# Patient Record
Sex: FEMALE | Race: WHITE | Employment: OTHER | URBAN - METROPOLITAN AREA
[De-identification: names, ages, dates, MRNs, and addresses within clinical notes are randomized per-mention and may not be internally consistent; named-entity substitution may affect disease eponyms.]

---

## 2017-05-26 ENCOUNTER — HOSPITAL ENCOUNTER (OUTPATIENT)
Dept: WOMENS IMAGING | Age: 67
Discharge: HOME | End: 2017-05-26
Payer: MEDICARE

## 2017-05-26 DIAGNOSIS — Z13.9 SCREENING: ICD-10-CM

## 2017-05-26 PROCEDURE — 77063 BREAST TOMOSYNTHESIS BI: CPT

## 2018-06-08 ENCOUNTER — HOSPITAL ENCOUNTER (OUTPATIENT)
Dept: WOMENS IMAGING | Age: 68
Discharge: HOME | End: 2018-06-10
Payer: MEDICARE

## 2018-06-08 DIAGNOSIS — Z12.31 ENCOUNTER FOR SCREENING MAMMOGRAM FOR BREAST CANCER: ICD-10-CM

## 2018-06-08 PROCEDURE — 77067 SCR MAMMO BI INCL CAD: CPT

## 2019-07-11 ENCOUNTER — HOSPITAL ENCOUNTER (OUTPATIENT)
Dept: WOMENS IMAGING | Age: 69
Discharge: HOME | End: 2019-07-13
Payer: MEDICARE

## 2019-07-11 DIAGNOSIS — Z12.31 ENCOUNTER FOR SCREENING MAMMOGRAM FOR BREAST CANCER: ICD-10-CM

## 2019-07-11 PROCEDURE — 77063 BREAST TOMOSYNTHESIS BI: CPT

## 2020-09-16 ENCOUNTER — HOSPITAL ENCOUNTER (OUTPATIENT)
Dept: WOMENS IMAGING | Age: 70
Discharge: HOME | End: 2020-09-18
Payer: MEDICARE

## 2020-09-16 PROCEDURE — 77063 BREAST TOMOSYNTHESIS BI: CPT

## 2021-12-09 ENCOUNTER — HOSPITAL ENCOUNTER (OUTPATIENT)
Dept: WOMENS IMAGING | Age: 71
Discharge: HOME | End: 2021-12-11
Payer: MEDICARE

## 2021-12-09 DIAGNOSIS — Z12.31 ENCOUNTER FOR SCREENING MAMMOGRAM FOR BREAST CANCER: ICD-10-CM

## 2021-12-09 PROCEDURE — 77063 BREAST TOMOSYNTHESIS BI: CPT

## 2023-04-18 ENCOUNTER — HOSPITAL ENCOUNTER (OUTPATIENT)
Dept: WOMENS IMAGING | Age: 73
Discharge: HOME | End: 2023-04-20
Payer: MEDICARE

## 2023-04-18 DIAGNOSIS — Z12.31 ENCOUNTER FOR SCREENING MAMMOGRAM FOR BREAST CANCER: ICD-10-CM

## 2023-04-18 PROCEDURE — 77063 BREAST TOMOSYNTHESIS BI: CPT

## 2024-07-02 ENCOUNTER — HOSPITAL ENCOUNTER (OUTPATIENT)
Dept: WOMENS IMAGING | Age: 74
Discharge: HOME | End: 2024-07-04
Payer: MEDICARE

## 2024-07-02 ENCOUNTER — TRANSCRIBE ORDERS (OUTPATIENT)
Dept: WOMENS IMAGING | Age: 74
End: 2024-07-02

## 2024-07-02 VITALS — HEIGHT: 64 IN | WEIGHT: 143 LBS | BODY MASS INDEX: 24.41 KG/M2

## 2024-07-02 DIAGNOSIS — Z12.31 SCREENING MAMMOGRAM FOR BREAST CANCER: Primary | ICD-10-CM

## 2024-07-02 DIAGNOSIS — Z12.31 SCREENING MAMMOGRAM FOR BREAST CANCER: ICD-10-CM

## 2024-07-02 PROCEDURE — 77063 BREAST TOMOSYNTHESIS BI: CPT

## 2025-02-03 ENCOUNTER — APPOINTMENT (OUTPATIENT)
Dept: RADIOLOGY | Facility: HOSPITAL | Age: 75
End: 2025-02-03
Payer: MEDICARE

## 2025-02-03 ENCOUNTER — APPOINTMENT (OUTPATIENT)
Dept: CARDIOLOGY | Facility: HOSPITAL | Age: 75
End: 2025-02-03
Payer: MEDICARE

## 2025-02-03 ENCOUNTER — HOSPITAL ENCOUNTER (INPATIENT)
Facility: HOSPITAL | Age: 75
LOS: 3 days | Discharge: NURSING, REHAB OR LONG-TERM CARE | End: 2025-02-07
Attending: EMERGENCY MEDICINE | Admitting: STUDENT IN AN ORGANIZED HEALTH CARE EDUCATION/TRAINING PROGRAM
Payer: MEDICARE

## 2025-02-03 DIAGNOSIS — S42.411A CLOSED SUPRACONDYLAR FRACTURE OF RIGHT HUMERUS, INITIAL ENCOUNTER: ICD-10-CM

## 2025-02-03 DIAGNOSIS — S32.401A CLOSED DISPLACED FRACTURE OF RIGHT ACETABULUM, UNSPECIFIED PORTION OF ACETABULUM, INITIAL ENCOUNTER (MULTI): ICD-10-CM
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
## 2025-02-03 LAB
ALBUMIN SERPL BCP-MCNC: 3.7 G/DL (ref 3.4–5)
ALP SERPL-CCNC: 74 U/L (ref 33–136)
ALT SERPL W P-5'-P-CCNC: 12 U/L (ref 7–45)
ANION GAP SERPL CALC-SCNC: 14 MMOL/L (ref 10–20)
AST SERPL W P-5'-P-CCNC: 19 U/L (ref 9–39)
ATRIAL RATE: 67 BPM
BASOPHILS # BLD AUTO: 0.04 X10*3/UL (ref 0–0.1)
BASOPHILS NFR BLD AUTO: 0.5 %
BILIRUB SERPL-MCNC: 0.3 MG/DL (ref 0–1.2)
BUN SERPL-MCNC: 16 MG/DL (ref 6–23)
CALCIUM SERPL-MCNC: 8.8 MG/DL (ref 8.6–10.3)
CARDIAC TROPONIN I PNL SERPL HS: 4 NG/L (ref 0–13)
CARDIAC TROPONIN I PNL SERPL HS: 5 NG/L (ref 0–13)
CHLORIDE SERPL-SCNC: 108 MMOL/L (ref 98–107)
CO2 SERPL-SCNC: 22 MMOL/L (ref 21–32)
CREAT SERPL-MCNC: 0.98 MG/DL (ref 0.5–1.05)
EGFRCR SERPLBLD CKD-EPI 2021: 61 ML/MIN/1.73M*2
EOSINOPHIL # BLD AUTO: 0.13 X10*3/UL (ref 0–0.4)
EOSINOPHIL NFR BLD AUTO: 1.6 %
ERYTHROCYTE [DISTWIDTH] IN BLOOD BY AUTOMATED COUNT: 12.5 % (ref 11.5–14.5)
GLUCOSE SERPL-MCNC: 129 MG/DL (ref 74–99)
HCT VFR BLD AUTO: 37.8 % (ref 36–46)
HGB BLD-MCNC: 12.8 G/DL (ref 12–16)
IMM GRANULOCYTES # BLD AUTO: 0.04 X10*3/UL (ref 0–0.5)
IMM GRANULOCYTES NFR BLD AUTO: 0.5 % (ref 0–0.9)
LYMPHOCYTES # BLD AUTO: 1.09 X10*3/UL (ref 0.8–3)
LYMPHOCYTES NFR BLD AUTO: 13.5 %
MAGNESIUM SERPL-MCNC: 2.01 MG/DL (ref 1.6–2.4)
MCH RBC QN AUTO: 29 PG (ref 26–34)
MCHC RBC AUTO-ENTMCNC: 33.9 G/DL (ref 32–36)
MCV RBC AUTO: 86 FL (ref 80–100)
MONOCYTES # BLD AUTO: 0.59 X10*3/UL (ref 0.05–0.8)
MONOCYTES NFR BLD AUTO: 7.3 %
NEUTROPHILS # BLD AUTO: 6.17 X10*3/UL (ref 1.6–5.5)
NEUTROPHILS NFR BLD AUTO: 76.6 %
NRBC BLD-RTO: 0 /100 WBCS (ref 0–0)
P AXIS: 72 DEGREES
P OFFSET: 185 MS
P ONSET: 134 MS
PLATELET # BLD AUTO: 174 X10*3/UL (ref 150–450)
POTASSIUM SERPL-SCNC: 3.4 MMOL/L (ref 3.5–5.3)
PR INTERVAL: 178 MS
PROT SERPL-MCNC: 6.6 G/DL (ref 6.4–8.2)
Q ONSET: 223 MS
QRS COUNT: 11 BEATS
QRS DURATION: 74 MS
QT INTERVAL: 450 MS
QTC CALCULATION(BAZETT): 475 MS
QTC FREDERICIA: 467 MS
R AXIS: 68 DEGREES
RBC # BLD AUTO: 4.41 X10*6/UL (ref 4–5.2)
SODIUM SERPL-SCNC: 141 MMOL/L (ref 136–145)
T AXIS: 72 DEGREES
T OFFSET: 448 MS
VENTRICULAR RATE: 67 BPM
WBC # BLD AUTO: 8.1 X10*3/UL (ref 4.4–11.3)

## 2025-02-03 PROCEDURE — 84075 ASSAY ALKALINE PHOSPHATASE: CPT | Performed by: EMERGENCY MEDICINE

## 2025-02-03 PROCEDURE — 72125 CT NECK SPINE W/O DYE: CPT | Performed by: RADIOLOGY

## 2025-02-03 PROCEDURE — 93005 ELECTROCARDIOGRAM TRACING: CPT

## 2025-02-03 PROCEDURE — 23600 CLTX PROX HUMRL FX W/O MNPJ: CPT | Performed by: ORTHOPAEDIC SURGERY

## 2025-02-03 PROCEDURE — 99222 1ST HOSP IP/OBS MODERATE 55: CPT | Performed by: ORTHOPAEDIC SURGERY

## 2025-02-03 PROCEDURE — 2500000004 HC RX 250 GENERAL PHARMACY W/ HCPCS (ALT 636 FOR OP/ED): Performed by: EMERGENCY MEDICINE

## 2025-02-03 PROCEDURE — 73502 X-RAY EXAM HIP UNI 2-3 VIEWS: CPT | Mod: RT

## 2025-02-03 PROCEDURE — 83735 ASSAY OF MAGNESIUM: CPT | Performed by: EMERGENCY MEDICINE

## 2025-02-03 PROCEDURE — 36415 COLL VENOUS BLD VENIPUNCTURE: CPT | Performed by: EMERGENCY MEDICINE

## 2025-02-03 PROCEDURE — 70450 CT HEAD/BRAIN W/O DYE: CPT

## 2025-02-03 PROCEDURE — 71045 X-RAY EXAM CHEST 1 VIEW: CPT | Performed by: RADIOLOGY

## 2025-02-03 PROCEDURE — 73000 X-RAY EXAM OF COLLAR BONE: CPT | Mod: RIGHT SIDE | Performed by: RADIOLOGY

## 2025-02-03 PROCEDURE — 99285 EMERGENCY DEPT VISIT HI MDM: CPT | Mod: 25 | Performed by: EMERGENCY MEDICINE

## 2025-02-03 PROCEDURE — 2500000001 HC RX 250 WO HCPCS SELF ADMINISTERED DRUGS (ALT 637 FOR MEDICARE OP): Performed by: EMERGENCY MEDICINE

## 2025-02-03 PROCEDURE — 73700 CT LOWER EXTREMITY W/O DYE: CPT | Mod: RT

## 2025-02-03 PROCEDURE — 71045 X-RAY EXAM CHEST 1 VIEW: CPT

## 2025-02-03 PROCEDURE — 73030 X-RAY EXAM OF SHOULDER: CPT | Mod: RIGHT SIDE | Performed by: RADIOLOGY

## 2025-02-03 PROCEDURE — 85025 COMPLETE CBC W/AUTO DIFF WBC: CPT | Performed by: EMERGENCY MEDICINE

## 2025-02-03 PROCEDURE — 99222 1ST HOSP IP/OBS MODERATE 55: CPT

## 2025-02-03 PROCEDURE — 73000 X-RAY EXAM OF COLLAR BONE: CPT | Mod: RT

## 2025-02-03 PROCEDURE — 99223 1ST HOSP IP/OBS HIGH 75: CPT | Performed by: STUDENT IN AN ORGANIZED HEALTH CARE EDUCATION/TRAINING PROGRAM

## 2025-02-03 PROCEDURE — 72125 CT NECK SPINE W/O DYE: CPT

## 2025-02-03 PROCEDURE — 73060 X-RAY EXAM OF HUMERUS: CPT | Mod: RIGHT SIDE | Performed by: RADIOLOGY

## 2025-02-03 PROCEDURE — 84484 ASSAY OF TROPONIN QUANT: CPT | Performed by: EMERGENCY MEDICINE

## 2025-02-03 PROCEDURE — G0378 HOSPITAL OBSERVATION PER HR: HCPCS

## 2025-02-03 PROCEDURE — 73502 X-RAY EXAM HIP UNI 2-3 VIEWS: CPT | Mod: RIGHT SIDE | Performed by: RADIOLOGY

## 2025-02-03 PROCEDURE — 2500000002 HC RX 250 W HCPCS SELF ADMINISTERED DRUGS (ALT 637 FOR MEDICARE OP, ALT 636 FOR OP/ED): Performed by: EMERGENCY MEDICINE

## 2025-02-03 PROCEDURE — 2500000002 HC RX 250 W HCPCS SELF ADMINISTERED DRUGS (ALT 637 FOR MEDICARE OP, ALT 636 FOR OP/ED): Performed by: STUDENT IN AN ORGANIZED HEALTH CARE EDUCATION/TRAINING PROGRAM

## 2025-02-03 PROCEDURE — 96376 TX/PRO/DX INJ SAME DRUG ADON: CPT

## 2025-02-03 PROCEDURE — 96375 TX/PRO/DX INJ NEW DRUG ADDON: CPT

## 2025-02-03 PROCEDURE — 73700 CT LOWER EXTREMITY W/O DYE: CPT | Mod: RIGHT SIDE | Performed by: STUDENT IN AN ORGANIZED HEALTH CARE EDUCATION/TRAINING PROGRAM

## 2025-02-03 PROCEDURE — 2500000005 HC RX 250 GENERAL PHARMACY W/O HCPCS: Performed by: STUDENT IN AN ORGANIZED HEALTH CARE EDUCATION/TRAINING PROGRAM

## 2025-02-03 PROCEDURE — 2500000001 HC RX 250 WO HCPCS SELF ADMINISTERED DRUGS (ALT 637 FOR MEDICARE OP): Performed by: STUDENT IN AN ORGANIZED HEALTH CARE EDUCATION/TRAINING PROGRAM

## 2025-02-03 PROCEDURE — 2500000004 HC RX 250 GENERAL PHARMACY W/ HCPCS (ALT 636 FOR OP/ED)

## 2025-02-03 PROCEDURE — 2500000004 HC RX 250 GENERAL PHARMACY W/ HCPCS (ALT 636 FOR OP/ED): Performed by: STUDENT IN AN ORGANIZED HEALTH CARE EDUCATION/TRAINING PROGRAM

## 2025-02-03 PROCEDURE — 73030 X-RAY EXAM OF SHOULDER: CPT | Mod: RT

## 2025-02-03 PROCEDURE — 27220 TREAT HIP SOCKET FRACTURE: CPT | Performed by: ORTHOPAEDIC SURGERY

## 2025-02-03 PROCEDURE — 73060 X-RAY EXAM OF HUMERUS: CPT | Mod: RT

## 2025-02-03 PROCEDURE — 70450 CT HEAD/BRAIN W/O DYE: CPT | Performed by: RADIOLOGY

## 2025-02-03 PROCEDURE — 96374 THER/PROPH/DIAG INJ IV PUSH: CPT | Mod: 59

## 2025-02-03 PROCEDURE — 96372 THER/PROPH/DIAG INJ SC/IM: CPT | Performed by: STUDENT IN AN ORGANIZED HEALTH CARE EDUCATION/TRAINING PROGRAM

## 2025-02-03 RX ORDER — ACETAMINOPHEN 650 MG/1
650 SUPPOSITORY RECTAL EVERY 4 HOURS PRN
Status: DISCONTINUED | OUTPATIENT
Start: 2025-02-03 | End: 2025-02-07

## 2025-02-03 RX ORDER — FENTANYL CITRATE 50 UG/ML
50 INJECTION, SOLUTION INTRAMUSCULAR; INTRAVENOUS ONCE
Status: COMPLETED | OUTPATIENT
Start: 2025-02-03 | End: 2025-02-03

## 2025-02-03 RX ORDER — MORPHINE SULFATE 2 MG/ML
2 INJECTION, SOLUTION INTRAMUSCULAR; INTRAVENOUS EVERY 4 HOURS PRN
Status: DISCONTINUED | OUTPATIENT
Start: 2025-02-03 | End: 2025-02-05

## 2025-02-03 RX ORDER — LOSARTAN POTASSIUM 50 MG/1
50 TABLET ORAL
COMMUNITY
Start: 2024-12-16

## 2025-02-03 RX ORDER — HYDROXYZINE HYDROCHLORIDE 25 MG/1
50 TABLET, FILM COATED ORAL EVERY 4 HOURS PRN
Status: DISCONTINUED | OUTPATIENT
Start: 2025-02-03 | End: 2025-02-07 | Stop reason: HOSPADM

## 2025-02-03 RX ORDER — ACETAMINOPHEN 160 MG/5ML
650 SOLUTION ORAL EVERY 4 HOURS PRN
Status: DISCONTINUED | OUTPATIENT
Start: 2025-02-03 | End: 2025-02-07

## 2025-02-03 RX ORDER — ENOXAPARIN SODIUM 100 MG/ML
40 INJECTION SUBCUTANEOUS EVERY 24 HOURS
Status: DISCONTINUED | OUTPATIENT
Start: 2025-02-03 | End: 2025-02-07 | Stop reason: HOSPADM

## 2025-02-03 RX ORDER — PANTOPRAZOLE SODIUM 40 MG/1
40 TABLET, DELAYED RELEASE ORAL 2 TIMES DAILY
COMMUNITY
Start: 2024-09-27

## 2025-02-03 RX ORDER — POLYETHYLENE GLYCOL 3350 17 G/17G
17 POWDER, FOR SOLUTION ORAL DAILY PRN
Status: DISCONTINUED | OUTPATIENT
Start: 2025-02-03 | End: 2025-02-07 | Stop reason: HOSPADM

## 2025-02-03 RX ORDER — ESCITALOPRAM OXALATE 10 MG/1
30 TABLET ORAL
COMMUNITY
Start: 2024-06-11 | End: 2025-06-11

## 2025-02-03 RX ORDER — NIFEDIPINE 30 MG/1
30 TABLET, FILM COATED, EXTENDED RELEASE ORAL
Status: DISCONTINUED | OUTPATIENT
Start: 2025-02-03 | End: 2025-02-07 | Stop reason: HOSPADM

## 2025-02-03 RX ORDER — MORPHINE SULFATE 4 MG/ML
4 INJECTION, SOLUTION INTRAMUSCULAR; INTRAVENOUS ONCE
Status: COMPLETED | OUTPATIENT
Start: 2025-02-03 | End: 2025-02-03

## 2025-02-03 RX ORDER — OXYCODONE HYDROCHLORIDE 5 MG/1
5 TABLET ORAL EVERY 6 HOURS PRN
Status: DISCONTINUED | OUTPATIENT
Start: 2025-02-03 | End: 2025-02-05

## 2025-02-03 RX ORDER — ACETAMINOPHEN 325 MG/1
650 TABLET ORAL EVERY 4 HOURS PRN
Status: DISCONTINUED | OUTPATIENT
Start: 2025-02-03 | End: 2025-02-07

## 2025-02-03 RX ORDER — EZETIMIBE 10 MG/1
10 TABLET ORAL
COMMUNITY
Start: 2024-09-20 | End: 2025-09-20

## 2025-02-03 RX ORDER — ACETAMINOPHEN 325 MG/1
1000 TABLET ORAL ONCE
Status: COMPLETED | OUTPATIENT
Start: 2025-02-03 | End: 2025-02-03

## 2025-02-03 RX ORDER — ONDANSETRON HYDROCHLORIDE 2 MG/ML
INJECTION, SOLUTION INTRAVENOUS
Status: COMPLETED
Start: 2025-02-03 | End: 2025-02-03

## 2025-02-03 RX ORDER — LOSARTAN POTASSIUM 50 MG/1
50 TABLET ORAL
Status: DISCONTINUED | OUTPATIENT
Start: 2025-02-03 | End: 2025-02-07 | Stop reason: HOSPADM

## 2025-02-03 RX ORDER — FLUTICASONE PROPIONATE 50 MCG
1 SPRAY, SUSPENSION (ML) NASAL DAILY PRN
COMMUNITY
Start: 2024-09-19

## 2025-02-03 RX ORDER — ONDANSETRON HYDROCHLORIDE 2 MG/ML
4 INJECTION, SOLUTION INTRAVENOUS EVERY 6 HOURS PRN
Status: DISCONTINUED | OUTPATIENT
Start: 2025-02-03 | End: 2025-02-07 | Stop reason: HOSPADM

## 2025-02-03 RX ORDER — POTASSIUM CHLORIDE 20 MEQ/1
20 TABLET, EXTENDED RELEASE ORAL ONCE
Status: COMPLETED | OUTPATIENT
Start: 2025-02-03 | End: 2025-02-03

## 2025-02-03 RX ORDER — FAMOTIDINE 20 MG/1
40 TABLET, FILM COATED ORAL NIGHTLY
Status: DISCONTINUED | OUTPATIENT
Start: 2025-02-03 | End: 2025-02-07 | Stop reason: HOSPADM

## 2025-02-03 RX ORDER — ONDANSETRON HYDROCHLORIDE 2 MG/ML
4 INJECTION, SOLUTION INTRAVENOUS ONCE
Status: COMPLETED | OUTPATIENT
Start: 2025-02-03 | End: 2025-02-03

## 2025-02-03 RX ORDER — FAMOTIDINE 40 MG/1
1 TABLET, FILM COATED ORAL NIGHTLY
COMMUNITY
Start: 2025-01-09 | End: 2026-01-09

## 2025-02-03 RX ORDER — EZETIMIBE 10 MG/1
10 TABLET ORAL
Status: DISCONTINUED | OUTPATIENT
Start: 2025-02-03 | End: 2025-02-07 | Stop reason: HOSPADM

## 2025-02-03 RX ORDER — ESCITALOPRAM OXALATE 10 MG/1
30 TABLET ORAL
Status: DISCONTINUED | OUTPATIENT
Start: 2025-02-03 | End: 2025-02-07 | Stop reason: HOSPADM

## 2025-02-03 RX ORDER — NIFEDIPINE 30 MG/1
30 TABLET, EXTENDED RELEASE ORAL
COMMUNITY
Start: 2024-04-23

## 2025-02-03 RX ORDER — LIDOCAINE 560 MG/1
1 PATCH PERCUTANEOUS; TOPICAL; TRANSDERMAL DAILY
Status: DISCONTINUED | OUTPATIENT
Start: 2025-02-03 | End: 2025-02-07 | Stop reason: HOSPADM

## 2025-02-03 RX ADMIN — ONDANSETRON 4 MG: 2 INJECTION INTRAMUSCULAR; INTRAVENOUS at 09:11

## 2025-02-03 RX ADMIN — ONDANSETRON 4 MG: 2 INJECTION INTRAMUSCULAR; INTRAVENOUS at 14:31

## 2025-02-03 RX ADMIN — MORPHINE SULFATE 2 MG: 2 INJECTION, SOLUTION INTRAMUSCULAR; INTRAVENOUS at 14:11

## 2025-02-03 RX ADMIN — ENOXAPARIN SODIUM 40 MG: 40 INJECTION SUBCUTANEOUS at 15:02

## 2025-02-03 RX ADMIN — FAMOTIDINE 40 MG: 20 TABLET, FILM COATED ORAL at 16:05

## 2025-02-03 RX ADMIN — NIFEDIPINE 30 MG: 30 TABLET, FILM COATED, EXTENDED RELEASE ORAL at 14:59

## 2025-02-03 RX ADMIN — POTASSIUM CHLORIDE 20 MEQ: 1500 TABLET, EXTENDED RELEASE ORAL at 11:48

## 2025-02-03 RX ADMIN — ONDANSETRON HYDROCHLORIDE 4 MG: 2 INJECTION, SOLUTION INTRAVENOUS at 10:10

## 2025-02-03 RX ADMIN — HYDROMORPHONE HYDROCHLORIDE 0.5 MG: 1 INJECTION, SOLUTION INTRAMUSCULAR; INTRAVENOUS; SUBCUTANEOUS at 11:21

## 2025-02-03 RX ADMIN — LIDOCAINE 4% 1 PATCH: 40 PATCH TOPICAL at 15:01

## 2025-02-03 RX ADMIN — OXYCODONE HYDROCHLORIDE 5 MG: 5 TABLET ORAL at 23:12

## 2025-02-03 RX ADMIN — ACETAMINOPHEN 975 MG: 325 TABLET ORAL at 11:48

## 2025-02-03 RX ADMIN — HYDROXYZINE HYDROCHLORIDE 50 MG: 25 TABLET ORAL at 16:12

## 2025-02-03 RX ADMIN — ESCITALOPRAM OXALATE 30 MG: 10 TABLET, FILM COATED ORAL at 14:59

## 2025-02-03 RX ADMIN — ONDANSETRON 4 MG: 2 INJECTION INTRAMUSCULAR; INTRAVENOUS at 10:10

## 2025-02-03 RX ADMIN — LOSARTAN POTASSIUM 50 MG: 50 TABLET, FILM COATED ORAL at 14:59

## 2025-02-03 RX ADMIN — EZETIMIBE 10 MG: 10 TABLET ORAL at 14:59

## 2025-02-03 RX ADMIN — MORPHINE SULFATE 4 MG: 4 INJECTION, SOLUTION INTRAMUSCULAR; INTRAVENOUS at 10:07

## 2025-02-03 RX ADMIN — FENTANYL CITRATE 50 MCG: 50 INJECTION INTRAMUSCULAR; INTRAVENOUS at 09:11

## 2025-02-03 SDOH — SOCIAL STABILITY: SOCIAL INSECURITY: WERE YOU ABLE TO COMPLETE ALL THE BEHAVIORAL HEALTH SCREENINGS?: YES

## 2025-02-03 SDOH — SOCIAL STABILITY: SOCIAL INSECURITY
WITHIN THE LAST YEAR, HAVE YOU BEEN RAPED OR FORCED TO HAVE ANY KIND OF SEXUAL ACTIVITY BY YOUR PARTNER OR EX-PARTNER?: NO

## 2025-02-03 SDOH — HEALTH STABILITY: MENTAL HEALTH: HOW MANY DRINKS CONTAINING ALCOHOL DO YOU HAVE ON A TYPICAL DAY WHEN YOU ARE DRINKING?: PATIENT DOES NOT DRINK

## 2025-02-03 SDOH — SOCIAL STABILITY: SOCIAL INSECURITY: WITHIN THE LAST YEAR, HAVE YOU BEEN HUMILIATED OR EMOTIONALLY ABUSED IN OTHER WAYS BY YOUR PARTNER OR EX-PARTNER?: NO

## 2025-02-03 SDOH — SOCIAL STABILITY: SOCIAL INSECURITY
WITHIN THE LAST YEAR, HAVE YOU BEEN KICKED, HIT, SLAPPED, OR OTHERWISE PHYSICALLY HURT BY YOUR PARTNER OR EX-PARTNER?: NO

## 2025-02-03 SDOH — SOCIAL STABILITY: SOCIAL INSECURITY: ARE THERE ANY APPARENT SIGNS OF INJURIES/BEHAVIORS THAT COULD BE RELATED TO ABUSE/NEGLECT?: NO

## 2025-02-03 SDOH — HEALTH STABILITY: MENTAL HEALTH: HOW OFTEN DO YOU HAVE A DRINK CONTAINING ALCOHOL?: NEVER

## 2025-02-03 SDOH — SOCIAL STABILITY: SOCIAL INSECURITY: ABUSE: ADULT

## 2025-02-03 SDOH — SOCIAL STABILITY: SOCIAL INSECURITY: ARE YOU OR HAVE YOU BEEN THREATENED OR ABUSED PHYSICALLY, EMOTIONALLY, OR SEXUALLY BY ANYONE?: NO

## 2025-02-03 SDOH — SOCIAL STABILITY: SOCIAL INSECURITY: WITHIN THE LAST YEAR, HAVE YOU BEEN AFRAID OF YOUR PARTNER OR EX-PARTNER?: NO

## 2025-02-03 SDOH — SOCIAL STABILITY: SOCIAL INSECURITY: DOES ANYONE TRY TO KEEP YOU FROM HAVING/CONTACTING OTHER FRIENDS OR DOING THINGS OUTSIDE YOUR HOME?: NO

## 2025-02-03 SDOH — SOCIAL STABILITY: SOCIAL INSECURITY: HAS ANYONE EVER THREATENED TO HURT YOUR FAMILY OR YOUR PETS?: NO

## 2025-02-03 SDOH — HEALTH STABILITY: MENTAL HEALTH: HOW OFTEN DO YOU HAVE SIX OR MORE DRINKS ON ONE OCCASION?: NEVER

## 2025-02-03 SDOH — ECONOMIC STABILITY: FOOD INSECURITY: WITHIN THE PAST 12 MONTHS, THE FOOD YOU BOUGHT JUST DIDN'T LAST AND YOU DIDN'T HAVE MONEY TO GET MORE.: NEVER TRUE

## 2025-02-03 SDOH — ECONOMIC STABILITY: FOOD INSECURITY: WITHIN THE PAST 12 MONTHS, YOU WORRIED THAT YOUR FOOD WOULD RUN OUT BEFORE YOU GOT THE MONEY TO BUY MORE.: NEVER TRUE

## 2025-02-03 SDOH — ECONOMIC STABILITY: INCOME INSECURITY: IN THE PAST 12 MONTHS HAS THE ELECTRIC, GAS, OIL, OR WATER COMPANY THREATENED TO SHUT OFF SERVICES IN YOUR HOME?: NO

## 2025-02-03 SDOH — SOCIAL STABILITY: SOCIAL INSECURITY: DO YOU FEEL ANYONE HAS EXPLOITED OR TAKEN ADVANTAGE OF YOU FINANCIALLY OR OF YOUR PERSONAL PROPERTY?: NO

## 2025-02-03 SDOH — SOCIAL STABILITY: SOCIAL INSECURITY: DO YOU FEEL UNSAFE GOING BACK TO THE PLACE WHERE YOU ARE LIVING?: NO

## 2025-02-03 SDOH — SOCIAL STABILITY: SOCIAL INSECURITY: HAVE YOU HAD THOUGHTS OF HARMING ANYONE ELSE?: NO

## 2025-02-03 ASSESSMENT — COGNITIVE AND FUNCTIONAL STATUS - GENERAL
PERSONAL GROOMING: A LOT
DRESSING REGULAR LOWER BODY CLOTHING: A LOT
TOILETING: A LOT
MOVING TO AND FROM BED TO CHAIR: TOTAL
PERSONAL GROOMING: A LOT
TOILETING: A LOT
HELP NEEDED FOR BATHING: A LOT
STANDING UP FROM CHAIR USING ARMS: TOTAL
MOVING FROM LYING ON BACK TO SITTING ON SIDE OF FLAT BED WITH BEDRAILS: A LOT
DAILY ACTIVITIY SCORE: 13
DAILY ACTIVITIY SCORE: 14
PATIENT BASELINE BEDBOUND: NO
STANDING UP FROM CHAIR USING ARMS: TOTAL
HELP NEEDED FOR BATHING: A LOT
TURNING FROM BACK TO SIDE WHILE IN FLAT BAD: TOTAL
DRESSING REGULAR LOWER BODY CLOTHING: A LOT
EATING MEALS: A LITTLE
CLIMB 3 TO 5 STEPS WITH RAILING: TOTAL
WALKING IN HOSPITAL ROOM: TOTAL
PERSONAL GROOMING: A LITTLE
MOBILITY SCORE: 7
MOVING FROM LYING ON BACK TO SITTING ON SIDE OF FLAT BED WITH BEDRAILS: A LOT
MOVING TO AND FROM BED TO CHAIR: TOTAL
EATING MEALS: A LITTLE
DRESSING REGULAR LOWER BODY CLOTHING: A LOT
TURNING FROM BACK TO SIDE WHILE IN FLAT BAD: TOTAL
EATING MEALS: A LITTLE
CLIMB 3 TO 5 STEPS WITH RAILING: TOTAL
WALKING IN HOSPITAL ROOM: TOTAL
MOBILITY SCORE: 7
DRESSING REGULAR UPPER BODY CLOTHING: A LOT
DRESSING REGULAR UPPER BODY CLOTHING: A LOT
TOILETING: A LOT
HELP NEEDED FOR BATHING: A LOT
DAILY ACTIVITIY SCORE: 13
DRESSING REGULAR UPPER BODY CLOTHING: A LOT

## 2025-02-03 ASSESSMENT — PAIN SCALES - GENERAL
PAINLEVEL_OUTOF10: 10 - WORST POSSIBLE PAIN
PAINLEVEL_OUTOF10: 7
PAINLEVEL_OUTOF10: 6
PAINLEVEL_OUTOF10: 9

## 2025-02-03 ASSESSMENT — ACTIVITIES OF DAILY LIVING (ADL)
GROOMING: NEEDS ASSISTANCE
DRESSING YOURSELF: NEEDS ASSISTANCE
ADEQUATE_TO_COMPLETE_ADL: YES
JUDGMENT_ADEQUATE_SAFELY_COMPLETE_DAILY_ACTIVITIES: YES
HEARING - RIGHT EAR: FUNCTIONAL
PATIENT'S MEMORY ADEQUATE TO SAFELY COMPLETE DAILY ACTIVITIES?: YES
HEARING - LEFT EAR: FUNCTIONAL
TOILETING: NEEDS ASSISTANCE
LACK_OF_TRANSPORTATION: NO
BATHING: NEEDS ASSISTANCE
FEEDING YOURSELF: NEEDS ASSISTANCE
WALKS IN HOME: UNABLE TO ASSESS

## 2025-02-03 ASSESSMENT — ENCOUNTER SYMPTOMS
NUMBNESS: 0
DYSURIA: 0
DIZZINESS: 0
FEVER: 0
SORE THROAT: 0
DIARRHEA: 0
HEMATURIA: 0
WEAKNESS: 0
SHORTNESS OF BREATH: 0
ABDOMINAL PAIN: 0
NECK PAIN: 0
ARTHRALGIAS: 1
CHEST TIGHTNESS: 0
VOMITING: 0
COLOR CHANGE: 0
BACK PAIN: 0
JOINT SWELLING: 1
COUGH: 0
NAUSEA: 0
CHILLS: 0

## 2025-02-03 ASSESSMENT — LIFESTYLE VARIABLES
EVER HAD A DRINK FIRST THING IN THE MORNING TO STEADY YOUR NERVES TO GET RID OF A HANGOVER: NO
HAVE YOU EVER FELT YOU SHOULD CUT DOWN ON YOUR DRINKING: NO
SKIP TO QUESTIONS 9-10: 1
TOTAL SCORE: 0
AUDIT-C TOTAL SCORE: 0
EVER FELT BAD OR GUILTY ABOUT YOUR DRINKING: NO
HAVE PEOPLE ANNOYED YOU BY CRITICIZING YOUR DRINKING: NO

## 2025-02-03 ASSESSMENT — PAIN DESCRIPTION - ORIENTATION
ORIENTATION: RIGHT

## 2025-02-03 ASSESSMENT — PATIENT HEALTH QUESTIONNAIRE - PHQ9
SUM OF ALL RESPONSES TO PHQ9 QUESTIONS 1 & 2: 0
2. FEELING DOWN, DEPRESSED OR HOPELESS: NOT AT ALL
1. LITTLE INTEREST OR PLEASURE IN DOING THINGS: NOT AT ALL

## 2025-02-03 ASSESSMENT — PAIN DESCRIPTION - LOCATION
LOCATION: SHOULDER

## 2025-02-03 ASSESSMENT — PAIN SCALES - PAIN ASSESSMENT IN ADVANCED DEMENTIA (PAINAD): TOTALSCORE: MEDICATION (SEE MAR);COLD APPLIED

## 2025-02-03 ASSESSMENT — PAIN - FUNCTIONAL ASSESSMENT: PAIN_FUNCTIONAL_ASSESSMENT: 0-10

## 2025-02-03 NOTE — CARE PLAN
Problem: Skin  Goal: Prevent/minimize sheer/friction injuries  2/3/2025 1637 by Heather Lawrence RN  Flowsheets (Taken 2/3/2025 1637)  Prevent/minimize sheer/friction injuries: Use pull sheet  2/3/2025 1636 by Heather Lawrence RN  Outcome: Progressing  Goal: Decreased wound size/increased tissue granulation at next dressing change  Outcome: Progressing  Goal: Participates in plan/prevention/treatment measures  Outcome: Progressing  Goal: Prevent/manage excess moisture  Outcome: Progressing  Goal: Promote/optimize nutrition  Outcome: Progressing  Goal: Promote skin healing  Outcome: Progressing   The patient's goals for the shift include      The clinical goals for the shift include pain control

## 2025-02-03 NOTE — H&P
Medical Group History and Physical  ASSESSMENT & PLAN:     Fall  Right humerus fracture  Right acetabular to superior pubic ramus fracture  - Presented from home after slipping on ice this morning, landing on her abdomen/chest without head trauma or loss of consciousness  - Found to have right humerus and right acetabular fracture  - Orthopedic surgery consulted  - Per ED likely nonoperative fractures  - Nonweightbearing to the right upper extremity  - Defer lower extremity weightbearing status orthopedic surgery  - PT/OT  - Pain management as ordered    Essential hypertension  Hypertensive urgency  - Elevated blood pressures in setting of pain from fall  - Resume home medications    Spoke with patient's daughter and niece at bedside.    VTE Prophylaxis: Enoxaparin subcutaneous      ---Of note, this documentation is completed using the Dragon Dictation system (voice recognition software). There may be spelling and/or grammatical errors that were not corrected prior to final submission.---    Juanpablo Richardson MD    HISTORY OF PRESENT ILLNESS:   Chief Complaint: Fall    History Of Present Illness:    Mana Avila is a 74 y.o. female with a significant past medical history of hypertension, GERD, hypertriglyceridemia, hyperlipidemia, generalized anxiety that presented from home after a fall.  This morning patient was leaving the house and slipped on ice causing her to fall landing on her abdomen/chest.  Patient since then developed significant right upper and lower extremity pain.  Right now pain is 4/10 especially with no movement however to 10/10 with movement.  Patient has not had any falls for greater than 20 years.  Did not have any head trauma, loss of consciousness, lightheadedness during or prior/after the fall.    ED course:  - Vitals: Temperature 96.8, HR 59, /66, RR 18 saturating 97% on room air  - Labs: Unremarkable CBC and CMP.  - Imaging: Refer to the imaging section for results of CT head/C-spine  without contrast, x-rays of chest, right clavicle, right humerus, right shoulder, as well as right hip CT without contrast.     Review of systems: 10 point review of systems is otherwise negative except as mentioned above.    PAST HISTORIES:     Past Medical History: History, GERD, hypertriglyceridemia, generalized anxiety    Past Surgical History: She has no past surgical history on file.      Social History: Previous tobacco smoker, quit 50 years ago, unknown pack-year history.  Consumes alcohol rarely.  Lives at home with her .  Otherwise independent with ADLs, does not require ambulatory device for assistance    Family History: No family history on file.     Allergies: Patient has no known allergies.    OBJECTIVE:     Last Recorded Vitals:  Vitals:    02/03/25 1018 02/03/25 1127 02/03/25 1223 02/03/25 1257   BP: (!) 190/83 (!) 193/77 (!) 182/68 168/63   BP Location:       Patient Position:       Pulse: 68 59 50 51   Resp: 17 17 17 17   Temp:       TempSrc:       SpO2: 98% 98% 99% 98%   Weight:       Height:         Last I/O:  No intake/output data recorded.    Physical Exam  Vitals reviewed.   Constitutional:       General: She is not in acute distress.     Appearance: Normal appearance. She is not toxic-appearing.   HENT:      Head: Normocephalic and atraumatic.      Nose: Nose normal.      Mouth/Throat:      Mouth: Mucous membranes are moist.      Pharynx: Oropharynx is clear.      Comments: Upper and lower dentures present.  Eyes:      Extraocular Movements: Extraocular movements intact.      Conjunctiva/sclera: Conjunctivae normal.   Cardiovascular:      Rate and Rhythm: Normal rate and regular rhythm.      Pulses: Normal pulses.      Heart sounds: Normal heart sounds.   Pulmonary:      Effort: Pulmonary effort is normal. No respiratory distress.      Breath sounds: Normal breath sounds. No wheezing.   Abdominal:      General: Bowel sounds are normal. There is no distension.      Palpations: Abdomen  is soft.      Tenderness: There is no abdominal tenderness. There is no guarding.   Musculoskeletal:         General: Normal range of motion.      Cervical back: Normal range of motion and neck supple.      Comments: Range of motion deferred to the right upper and lower extremity with acute fractures.   Neurological:      General: No focal deficit present.      Mental Status: She is alert and oriented to person, place, and time. Mental status is at baseline.   Psychiatric:         Mood and Affect: Mood normal.         Behavior: Behavior normal.         Thought Content: Thought content normal.       Scheduled Medications    PRN Medications    Continuous Medications      Outpatient Medications:  Prior to Admission medications    Medication Sig Start Date End Date Taking? Authorizing Provider   escitalopram (Lexapro) 10 mg tablet Take 3 tablets (30 mg) by mouth once daily. 6/11/24 6/11/25 Yes Historical Provider, MD   ezetimibe (Zetia) 10 mg tablet Take 1 tablet (10 mg) by mouth once daily. 9/20/24 9/20/25 Yes Historical Provider, MD   famotidine (Pepcid) 40 mg tablet Take 1 tablet (40 mg) by mouth once daily at bedtime. 1/9/25 1/9/26 Yes Historical Provider, MD   fluticasone (Flonase) 50 mcg/actuation nasal spray 1 spray by Does not apply route once daily as needed for rhinitis or allergies. 9/19/24  Yes Historical Provider, MD   losartan (Cozaar) 50 mg tablet Take 1 tablet (50 mg) by mouth once daily. 12/16/24  Yes Historical Provider, MD   NIFEdipine CC 30 mg 24 hr tablet Take 1 tablet (30 mg) by mouth once daily. 4/23/24  Yes Historical Provider, MD   pantoprazole (ProtoNix) 40 mg EC tablet Take 1 tablet (40 mg) by mouth twice a day. 9/27/24  Yes Historical Provider, MD   CHOLECALCIFEROL, VITAMIN D3, ORAL Take by mouth once daily.    Historical Provider, MD       LABS AND IMAGING:     Labs:  Results from last 7 days   Lab Units 02/03/25  1000   WBC AUTO x10*3/uL 8.1   RBC AUTO x10*6/uL 4.41   HEMOGLOBIN g/dL 12.8    HEMATOCRIT % 37.8   MCV fL 86   MCH pg 29.0   MCHC g/dL 33.9   RDW % 12.5   PLATELETS AUTO x10*3/uL 174     Results from last 7 days   Lab Units 02/03/25  1000   SODIUM mmol/L 141   POTASSIUM mmol/L 3.4*   CHLORIDE mmol/L 108*   CO2 mmol/L 22   BUN mg/dL 16   CREATININE mg/dL 0.98   GLUCOSE mg/dL 129*   PROTEIN TOTAL g/dL 6.6   CALCIUM mg/dL 8.8   BILIRUBIN TOTAL mg/dL 0.3   ALK PHOS U/L 74   AST U/L 19   ALT U/L 12     Results from last 7 days   Lab Units 02/03/25  1000   MAGNESIUM mg/dL 2.01     Results from last 7 days   Lab Units 02/03/25  1000   TROPHS ng/L 4       Imaging:  CT hip right wo IV contrast  Addendum: Interpreted By:  Jolie Mei,    ADDENDUM:   On further imaging review, there is a tiny crescentic shaped calcific   density along the inferior margin of the pubic symphysis at the   midline suggestive of an acute-subacute minimally displaced avulsion   fracture.        Signed by: Jolie Mei 2/3/2025 11:38 AM        -------- ORIGINAL REPORT --------   Dictation workstation:   [id number]  Narrative: Interpreted By:  Jolie Mei,   STUDY:  CT HIP RIGHT WO IV CONTRAST;  2/3/2025 11:22 am      INDICATION:  Signs/Symptoms:R hip pain, fall.      COMPARISON:  Same day radiographs.      ACCESSION NUMBER(S):  [id number]      ORDERING CLINICIAN:  FABIO BASHIR      TECHNIQUE:  CT imaging of the right hip was obtained without administration of  intravenous contrast medium. Coronal and sagittal reformatted images  were performed.      FINDINGS:  OSSEOUS STRUCTURES:  There is a minimal minimally displaced fracture of the anterior  acetabulum extending into the base of the superior pubic ramus. No  additional fracture is identified. No dislocation. There is mild  femoroacetabular joint space narrowing posteriorly. No significant  hip joint effusion is visualized.      SOFT TISSUES:  No subcutaneous or intramuscular hematoma. A small fat containing  right inguinal hernia is noted. No acute  abnormality is present in  the imaged pelvis.      Impression: Minimally displaced fracture anterior acetabulum extending into base  of superior pubic ramus.      MACRO:  None      Signed by: ConstantinoSairaDavid Mei 2/3/2025 11:33 AM  Dictation workstation:   [id number]  XR shoulder right 2+ views, XR humerus right, XR clavicle right  Narrative: Interpreted By:  Arden Michael,   STUDY:  XR CLAVICLE RIGHT; XR HUMERUS RIGHT; XR SHOULDER RIGHT 2+ VIEWS;  2/3/2025 9:57 am      INDICATION:  Signs/Symptoms:pain, fall; Signs/Symptoms:R arm pain fall;  Signs/Symptoms:R shoulder pain.      COMPARISON:  None.      ACCESSION NUMBER(S):  [id number]; [id number]; [id number]      ORDERING CLINICIAN:  FABIO BASHIR      FINDINGS:  Bony structures:  Slightly impacted fracture at the humeral neck, and  suspected nondisplaced fracture of the greater tuberosity. The  remaining bony structures appear intact.      Joint spaces:  Maintained      Soft tissues:  Unremarkable without significant edema or radiodense  foreign body      Other:  None significant      Impression: Humeral fracture.      MACRO:  None      Signed by: Arden Michael 2/3/2025 10:15 AM  Dictation workstation:   [id number]  XR chest 1 view  Narrative: Interpreted By:  Arden Michael,   STUDY:  XR CHEST 1 VIEW;  2/3/2025 9:57 am      INDICATION:  Signs/Symptoms:fall.      COMPARISON:  None.      ACCESSION NUMBER(S):  [id number]      ORDERING CLINICIAN:  FABIO BASHIR      FINDINGS:  CARDIOMEDIASTINAL SILHOUETTE AND VASCULATURE:      Cardiac size:  Within normal limits.  Aortic shadow:  Within normal limits.      Mediastinal contours: Within normal limits.      Pulmonary vasculature:  The central vasculature is unremarkable      LUNGS:  Several linear opacities at the left lower lung medially may be due  to atelectasis and/or fibrosis. The lungs otherwise are clear.      ABDOMEN AND OTHER FINDINGS:  No remarkable upper abdominal findings.      BONES:  Slightly impacted  fracture of the right humeral neck is noted.      Impression: 1.  Left lower lung atelectasis or fibrosis.  2. Right humeral fracture.      Signed by: Arden Michael 2/3/2025 10:12 AM  Dictation workstation:   [id number]  XR hip right with pelvis when performed 2 or 3 views  Narrative: Interpreted By:  Arden Michael,   STUDY:  XR HIP RIGHT WITH PELVIS WHEN PERFORMED 2 OR 3 VIEWS;  2/3/2025 9:57  am      INDICATION:  Signs/Symptoms:R hip pain, fall.      COMPARISON:  None.      ACCESSION NUMBER(S):  [id number]      ORDERING CLINICIAN:  FABIO BASHIR      FINDINGS:  Bony structures:  Intact      Joint spaces:  Maintained      Soft tissues:  Unremarkable without significant edema or radiodense  foreign body      Other:  None significant      Impression: No acute findings.      MACRO:  None      Signed by: Arden Michael 2/3/2025 10:11 AM  Dictation workstation:   [id number]  CT head wo IV contrast, CT cervical spine wo IV contrast  Narrative: Interpreted By:  Israel Cruz,   STUDY:  CT HEAD WO IV CONTRAST; CT CERVICAL SPINE WO IV CONTRAST;  2/3/2025  9:28 am      INDICATION:  Signs/Symptoms:fall head injury; Signs/Symptoms:fall.          COMPARISON:  None      ACCESSION NUMBER(S):  [id number]; [id number]      ORDERING CLINICIAN:  FABIO BASHIR      TECHNIQUE:  CT of the brain from the skull vertex to the skull base, without  intravenous contrast      CT cervical spine from the craniocervical junction through the  cervicothoracic junction without IV contrast, including sagittal and  coronal reformatted images      FINDINGS:  CT BRAIN      TRAUMA-RELATED      Brain Injury (BIG) guidelines CT values:      Skull fracture: No  SDH (subdural hematoma): None detected  EDH (epidural hematoma): None detected  IPH (intraparenchymal hemorrhage): None detected  SAH (subarachnoid hemorrhage): None detected  IVH (intraventricular hemorrhage): None detected      Reference:  Jono B, Pérez RS, Sea M, et al. The BIG (brain  injury  guidelines) project: defining the management of traumatic brain  injury by acute care surgeons. J Trauma Acute Care Surg.  2014;76:430v504.      OTHER      ACUTE INTRACRANIAL MASS EFFECT:  Negative      CT EVIDENCE OF ACUTE / SUBACUTE TERRITORIAL ISCHEMIA:  Negative      VENTRICLES:  Normal caliber and configuration      OTHER BRAIN FINDINGS:  No additional findings to note      INCLUDED PARANASAL SINUSES: All clear      INCLUDED MASTOID AIR CELLS: All clear      SKULL:  No lytic or blastic lesion      EXTRACRANIAL SOFT TISSUES:  Scalp and occular globes grossly normal  by CT      -------      CT CERVICAL SPINE      COUNTING REFERENCE: Craniocervical junction      CRANIOCERVICAL JUNCTION:  Intact      CERVICAL ALIGNMENT:  Rightward curvature is probably positional. No  jumped facets are other acute traumatic alignment abnormality      ACUTE FRACTURE: Negative      AGGRESSIVE OSSEOUS LESION: Negative      BONY CANAL AND FORAMINA:  Disc osteophyte complexes at C3-4 and C5-6  contribute to mild and moderate bony foraminal and bony canal stenoses      PARASPINAL SOFT TISSUES:  No large acute hematoma or other acute  posttraumatic finding      OTHER INCLUDED STRUCTURES:  No acute or contributory soft tissue  abnormality in the other cervical and upper thoracic soft tissues      Impression: NO ACUTE INTRACRANIAL PROCESS. SKULL INTACT      NO ACUTE FRACTURE OR SUBLUXATION IN THE CERVICAL SPINE      THIS REPORT SERVES AS THE DIAGNOSTIC INTERPRETATION FOR TWO EXAMS  PERFORMED CONCURRENTLY: CT BRAIN WITHOUT IV CONTRAST AND CT CERVICAL  SPINE WITHOUT IV CONTRAST      MACRO:  None      Signed by: Israel Cruz 2/3/2025 9:46 AM  Dictation workstation:   [id number]  ECG 12 lead  Normal sinus rhythm  Nonspecific ST abnormality  Abnormal ECG  When compared with ECG of 03-FEB-2025 09:04, (unconfirmed)  No significant change was found

## 2025-02-03 NOTE — ED PROVIDER NOTES
74-year-old female presents emergency department via EMS with report of fall.  Patient states that she slipped on ice causing her to fall forward.  She reports pain in her right upper arm/shoulder as well as right hip.  Denies any fevers, coughing, or congestion.  Denies chest pain or difficulty breathing.  No abdominal pain.  Admits episode of nausea and vomiting after the fall.  No dysuria, diarrhea, constipation, black or bloody stools.  Patient states she was not able to get herself up but has not been ambulatory since the fall.  She is not on any anticoagulants.  Denies loss of consciousness and states she does not believe she hit her head. Chart review shows history of hypertension, osteoarthritis, osteopenia, depression, and chronic kidney disease.      History provided by:  Patient, EMS personnel and medical records   used: No           ------------------------------------------------------------------------------------------------------------------------------------------    VS: As documented in the triage note and EMR flowsheet from this visit were reviewed.    Review of Systems  Constitutional: no fever, chills  Eyes: no redness, discharge, pain  HENT: no sore throat, nose bleeds, congestion, rhinorrhea   Cardiovascular: no chest pain, leg edema, palpitations  Respiratory: no shortness of breath, cough, wheezing  GI: Admits to nausea and vomiting no diarrhea, pain, constipation, BRBPR, melena  : no dysuria, frequency, hematuria  Musculoskeletal: no neck pain, stiffness,  no joint deformity, swelling reports right upper arm/shoulder pain and right hip pain  Skin: no rash, erythema, wounds  Neurological: no headache, dizziness, lightheadedness, weakness, numbness, or tingling  Psychiatric: no suicidal thoughts, confusion, agitation  Metabolic: no polyuria or polydipsia  Hematologic: no increased bleeding or bruising  Immunology: No immunocompromise state    [facility]  Nursing notes  reviewed and confirmed by me.  Chart review performed including medications, allergies, and medical, surgical, and family history  Visit Vitals  BP (!) 196/81   Pulse 56   Temp 36.2 °C (97.2 °F)   Resp 17     Physical Exam  Vitals and nursing note reviewed.   Constitutional:       General: She is not in acute distress.     Appearance: Normal appearance. She is not ill-appearing.   HENT:      Head: Normocephalic and atraumatic.      Comments: No Matias sign or raccoon eyes.  Midface is stable     Right Ear: External ear normal.      Left Ear: External ear normal.      Nose: Nose normal. No congestion or rhinorrhea.      Mouth/Throat:      Mouth: Mucous membranes are moist.      Pharynx: No oropharyngeal exudate or posterior oropharyngeal erythema.   Eyes:      Extraocular Movements: Extraocular movements intact.      Conjunctiva/sclera: Conjunctivae normal.      Pupils: Pupils are equal, round, and reactive to light.   Neck:      Comments: Patient presents with c-collar in place  Cardiovascular:      Rate and Rhythm: Normal rate and regular rhythm.      Pulses: Normal pulses.      Heart sounds: Normal heart sounds.   Pulmonary:      Effort: Pulmonary effort is normal. No respiratory distress.      Breath sounds: Normal breath sounds. No stridor. No wheezing, rhonchi or rales.   Chest:      Chest wall: No tenderness (No chest wall tenderness, crepitance, or flail chest).   Abdominal:      General: There is no distension.      Palpations: Abdomen is soft.      Tenderness: There is no abdominal tenderness. There is no guarding or rebound.   Musculoskeletal:         General: Tenderness and signs of injury present. No swelling or deformity.      Cervical back: Neck supple.      Right lower leg: No edema.      Left lower leg: No edema.      Comments: No calf tenderness   Pelvis is stable.  Patient does report pain with palpation of the right greater trochanter.  Reports pain with movement at the right hip joint.  Right leg  does appear slightly shortened compared to the left.  2+ DP pulses bilaterally.  Equal strength and sensation with plantar and dorsiflexion.  Right upper extremity with tenderness and some slight swelling at the area of the right glenohumeral joint and proximal humerus.  No overlying skin change.  Decreased range of motion secondary to pain.  2+ radial pulses bilaterally with equal  strength bilaterally.  Tenderness along the right clavicle without obvious deformity.   Skin:     General: Skin is warm and dry.      Capillary Refill: Capillary refill takes less than 2 seconds.      Coloration: Skin is not jaundiced.      Findings: No rash.   Neurological:      General: No focal deficit present.      Mental Status: She is alert and oriented to person, place, and time.      Sensory: No sensory deficit.      Motor: No weakness.      Comments: Cranial nerves II through XII grossly intact.   Psychiatric:         Mood and Affect: Mood normal.         Behavior: Behavior normal.        Past Medical History:   Diagnosis Date    Arthritis     Cancer (Multi)     Hypertension       Past Surgical History:   Procedure Laterality Date    APPENDECTOMY      FRACTURE SURGERY        Social History     Socioeconomic History    Marital status:    Tobacco Use    Smoking status: Former     Current packs/day: 0.00     Types: Cigarettes     Quit date: 1975     Years since quittin.1    Smokeless tobacco: Never     Social Drivers of Health     Financial Resource Strain: Low Risk  (2/3/2025)    Overall Financial Resource Strain (CARDIA)     Difficulty of Paying Living Expenses: Not very hard   Food Insecurity: No Food Insecurity (2/3/2025)    Hunger Vital Sign     Worried About Running Out of Food in the Last Year: Never true     Ran Out of Food in the Last Year: Never true   Transportation Needs: No Transportation Needs (2/3/2025)    PRAPARE - Transportation     Lack of Transportation (Medical): No     Lack of Transportation  (Non-Medical): No   Physical Activity: Unknown (6/10/2024)    Received from [facility]    Exercise Vital Sign     Days of Exercise per Week: 2 days   Stress: No Stress Concern Present (3/21/2023)    Received from [facility]    [employer] [place] - Occupational Stress Questionnaire     Feeling of Stress : Only a little   Social Connections: Moderately Isolated (6/10/2024)    Received from [facility]    Social Connection and Isolation Panel [NHANES]     Frequency of Communication with Friends and Family: More than three times a week     Frequency of Social Gatherings with Friends and Family: Twice a week     Attends Mosque Services: Never     Active Member of Clubs or Organizations: No     Attends Club or Organization Meetings: Never     Marital Status:    Intimate Partner Violence: Not At Risk (2/3/2025)    Humiliation, Afraid, Rape, and Kick questionnaire     Fear of Current or Ex-Partner: No     Emotionally Abused: No     Physically Abused: No     Sexually Abused: No   Housing Stability: High Risk (2/3/2025)    Housing Stability Vital Sign     Unable to Pay for Housing in the Last Year: No     Number of Times Moved in the Last Year: 45     Homeless in the Last Year: No      ------------------------------------------------------------------------------------------------------------------------------------------  CT hip right wo IV contrast   Final Result   Addendum (preliminary) 1 of 1   Interpreted By:  Jolie Mei,    ADDENDUM:   On further imaging review, there is a tiny crescentic shaped calcific   density along the inferior margin of the pubic symphysis at the   midline suggestive of an acute-subacute minimally displaced avulsion   fracture.        Signed by: Jolie Mei 2/3/2025 11:38 AM        -------- ORIGINAL REPORT --------   Dictation workstation:   [id number]      Final   Minimally displaced fracture anterior acetabulum extending into base   of  superior pubic ramus.        MACRO:   None        Signed by: Jolie Mei 2/3/2025 11:33 AM   Dictation workstation:   [id number]      XR hip right with pelvis when performed 2 or 3 views   Final Result   No acute findings.        MACRO:   None        Signed by: Arden Michael 2/3/2025 10:11 AM   Dictation workstation:   [id number]      XR shoulder right 2+ views   Final Result   Humeral fracture.        MACRO:   None        Signed by: Arden Michael 2/3/2025 10:15 AM   Dictation workstation:   [id number]      XR humerus right   Final Result   Humeral fracture.        MACRO:   None        Signed by: Arden Michael 2/3/2025 10:15 AM   Dictation workstation:   [id number]      XR clavicle right   Final Result   Humeral fracture.        MACRO:   None        Signed by: Arden Michael 2/3/2025 10:15 AM   Dictation workstation:   [id number]      XR chest 1 view   Final Result   1.  Left lower lung atelectasis or fibrosis.   2. Right humeral fracture.        Signed by: Arden Michael 2/3/2025 10:12 AM   Dictation workstation:   [id number]      CT head wo IV contrast   Final Result   NO ACUTE INTRACRANIAL PROCESS. SKULL INTACT        NO ACUTE FRACTURE OR SUBLUXATION IN THE CERVICAL SPINE        THIS REPORT SERVES AS THE DIAGNOSTIC INTERPRETATION FOR TWO EXAMS   PERFORMED CONCURRENTLY: CT BRAIN WITHOUT IV CONTRAST AND CT CERVICAL   SPINE WITHOUT IV CONTRAST        MACRO:   None        Signed by: Israel Cruz 2/3/2025 9:46 AM   Dictation workstation:   [id number]      CT cervical spine wo IV contrast   Final Result   NO ACUTE INTRACRANIAL PROCESS. SKULL INTACT        NO ACUTE FRACTURE OR SUBLUXATION IN THE CERVICAL SPINE        THIS REPORT SERVES AS THE DIAGNOSTIC INTERPRETATION FOR TWO EXAMS   PERFORMED CONCURRENTLY: CT BRAIN WITHOUT IV CONTRAST AND CT CERVICAL   SPINE WITHOUT IV CONTRAST        MACRO:   None        Signed by: Israel Cruz 2/3/2025 9:46 AM   Dictation workstation:   [id number]         Labs Reviewed   CBC  WITH AUTO DIFFERENTIAL - Abnormal       Result Value    WBC 8.1      nRBC 0.0      RBC 4.41      Hemoglobin 12.8      Hematocrit 37.8      MCV 86      MCH 29.0      MCHC 33.9      RDW 12.5      Platelets 174      Neutrophils % 76.6      Immature Granulocytes %, Automated 0.5      Lymphocytes % 13.5      Monocytes % 7.3      Eosinophils % 1.6      Basophils % 0.5      Neutrophils Absolute 6.17 (*)     Immature Granulocytes Absolute, Automated 0.04      Lymphocytes Absolute 1.09      Monocytes Absolute 0.59      Eosinophils Absolute 0.13      Basophils Absolute 0.04     COMPREHENSIVE METABOLIC PANEL - Abnormal    Glucose 129 (*)     Sodium 141      Potassium 3.4 (*)     Chloride 108 (*)     Bicarbonate 22      Anion Gap 14      Urea Nitrogen 16      Creatinine 0.98      eGFR 61      Calcium 8.8      Albumin 3.7      Alkaline Phosphatase 74      Total Protein 6.6      AST 19      Bilirubin, Total 0.3      ALT 12     MAGNESIUM - Normal    Magnesium 2.01     SERIAL TROPONIN-INITIAL - Normal    Troponin I, High Sensitivity 4      Narrative:     Less than 99th percentile of normal range cutoff-  Female and children under 18 years old <14 ng/L; Male <21 ng/L: Negative  Repeat testing should be performed if clinically indicated.     Female and children under 18 years old 14-50 ng/L; Male 21-50 ng/L:  Consistent with possible cardiac damage and possible increased clinical   risk. Serial measurements may help to assess extent of myocardial damage.     >50 ng/L: Consistent with cardiac damage, increased clinical risk and  myocardial infarction. Serial measurements may help assess extent of   myocardial damage.      NOTE: Children less than 1 year old may have higher baseline troponin   levels and results should be interpreted in conjunction with the overall   clinical context.     NOTE: Troponin I testing is performed using a different   testing methodology at [facility] than at other   [facility]. Direct  result comparisons should only   be made within the same method.   SERIAL TROPONIN, 1 HOUR - Normal    Troponin I, High Sensitivity 5      Narrative:     Less than 99th percentile of normal range cutoff-  Female and children under 18 years old <14 ng/L; Male <21 ng/L: Negative  Repeat testing should be performed if clinically indicated.     Female and children under 18 years old 14-50 ng/L; Male 21-50 ng/L:  Consistent with possible cardiac damage and possible increased clinical   risk. Serial measurements may help to assess extent of myocardial damage.     >50 ng/L: Consistent with cardiac damage, increased clinical risk and  myocardial infarction. Serial measurements may help assess extent of   myocardial damage.      NOTE: Children less than 1 year old may have higher baseline troponin   levels and results should be interpreted in conjunction with the overall   clinical context.     NOTE: Troponin I testing is performed using a different   testing methodology at [facility] than at other   [facility]. Direct result comparisons should only   be made within the same method.   TROPONIN SERIES- (INITIAL, 1 HR)    Narrative:     The following orders were created for panel order Troponin I Series, High Sensitivity (0, 1 HR).  Procedure                               Abnormality         Status                     ---------                               -----------         ------                     Troponin I, High Sensiti...[[id number]]  Normal              Final result               Troponin, High Sensitivi...[[id number]]  Normal              Final result                 Please view results for these tests on the individual orders.        Medical Decision Making  EKG interpreted by ED physician: Normal sinus rhythm rate of 67.  IN, QRS, QTc intervals all within normal limits.  No significant ST elevations or depressions.  No significant Q waves.  Good R wave progression.  Normal axis.    74-year-old  female presents emergency department with reports of mechanical fall.  She is complaining of right upper arm pain and right hip pain.  On my exam she is afebrile nontoxic.  ABCs are intact.  A thorough workup is obtained.  While awaiting nursing staff was concerned that she may be having some abnormal heart rhythm on monitoring.  EKGs were obtained and showed no acute ACS or significant arrhythmia.  Cardiac enzymes x 2 are negative.  CBC does not show significant leukocytosis or anemia.  CMP shows mild hypokalemia which is repleted orally and no significant metabolic abnormality.  Head CT shows no acute intracranial process such as hemorrhage or mass effect.  CT cervical spine shows no traumatic injury.  C-collar was cleared by myself.  Chest x-ray does not show acute cardiopulmonary process such as pneumonia, pleural effusion, or pulmonary edema.  X-ray imaging of the right upper extremity demonstrates a humeral fracture.  Patient is provided with sling.  X-ray imaging of the right hip shows no fracture or traumatic malalignment.  Though patient does have significant pain and does not feel she can bear weight.  Therefore CT imaging is obtained and demonstrates displaced fracture of the acetabulum extending into the superior pubic ramus.  Advised patient and family of these findings.  I offered admission as patient seems to have significant moving around.  She and family are agreeable with this plan.  Case was discussed with hospitalist on-call.  I also discussed this case with orthopedic surgery Dr. Garcia who confirms he does not believe this pelvic fracture is operative in nature and can be managed with symptomatic treatment.  Orthopedic surgery does state that they would be happy to be on consult.  Patient treated symptomatically here in the emergency department with multiple doses of pain medication including fentanyl, Dilaudid, and morphine.  Patient also given Zofran and Tylenol.       Diagnoses as of  02/03/25 1541   Fall, initial encounter   Closed supracondylar fracture of right humerus, initial encounter   Closed displaced fracture of right acetabulum, unspecified portion of acetabulum, initial encounter (Multi)      1. Fall, initial encounter        2. Closed supracondylar fracture of right humerus, initial encounter        3. Closed displaced fracture of right acetabulum, unspecified portion of acetabulum, initial encounter (Multi)           Procedures     This note was dictated using dragon software and may contain errors related to dictation interpretation errors.      Willian Holland,   02/03/25 1542

## 2025-02-03 NOTE — CONSULTS
Inpatient consult to Orthopaedic Surgery  Consult performed by: TRAVIS Childers-CNP  Consult ordered by: Juanpablo Richardson MD  Reason for consult: right humerus and acetabulum fracture          Consult Note  Patient: Mana Avila  Unit/Bed: 902/902-A  YOB: 1950  MRN: [id number]  Acct: [id number]   Admitting Diagnosis: Fall, initial encounter [W19.XXXA]  Closed supracondylar fracture of right humerus, initial encounter [S42.411A]  Fall at home, initial encounter [W19.XXXA, Y92.009]  Closed displaced fracture of right acetabulum, unspecified portion of acetabulum, initial encounter (Multi) [S32.401A]  Date:  2/3/2025  Hospital Day: 0    Complaint:  Right upper and lower extremity pain s/p fall    History of Present Illness:  Mana Avila is a 74 year old female patient [place] [facility] [place] a fall at [place]. Patient reports she was leaving the house and slipped on some ice causing her to fall landing on her abdomen/chest with her right arm extended over her head. She reports she developed significant pain to right groin immediately following the fall. Imaging in ER shows slightly impacted right humeral neck fracture and right acetabulum into superior pubic ramus fracture. Orthopedics was consulted for further evaluation and treatment recommendations.     PMHx:  Past Medical History:   Diagnosis Date    Arthritis     Cancer (Multi)     Hypertension        PSHx:  Past Surgical History:   Procedure Laterality Date    APPENDECTOMY      FRACTURE SURGERY         Social Hx:  Social History     Socioeconomic History    Marital status:    Tobacco Use    Smoking status: Former     Current packs/day: 0.00     Types: Cigarettes     Quit date: 1975     Years since quittin.1    Smokeless tobacco: Never     Social Drivers of Health     Financial Resource Strain: Low Risk  (2/3/2025)    Overall Financial Resource Strain (CARDIA)     Difficulty of Paying Living Expenses:  Not very hard   Food Insecurity: No Food Insecurity (2/3/2025)    Hunger Vital Sign     Worried About Running Out of Food in the Last Year: Never true     Ran Out of Food in the Last Year: Never true   Transportation Needs: No Transportation Needs (2/3/2025)    PRAPARE - Transportation     Lack of Transportation (Medical): No     Lack of Transportation (Non-Medical): No   Physical Activity: Unknown (6/10/2024)    Received from [facility]    Exercise Vital Sign     Days of Exercise per Week: 2 days   Stress: No Stress Concern Present (3/21/2023)    Received from [facility]    [employer] [place] [employer] - Occupational Stress Questionnaire     Feeling of Stress : Only a little   Social Connections: Moderately Isolated (6/10/2024)    Received from [facility]    Social Connection and Isolation Panel [NHANES]     Frequency of Communication with Friends and Family: More than three times a week     Frequency of Social Gatherings with Friends and Family: Twice a week     Attends [employer] Services: Never     Active Member of Clubs or Organizations: No     Attends Club or Organization Meetings: Never     Marital Status:    Intimate Partner Violence: Not At Risk (2/3/2025)    Humiliation, Afraid, Rape, and Kick questionnaire     Fear of Current or Ex-Partner: No     Emotionally Abused: No     Physically Abused: No     Sexually Abused: No   Housing Stability: High Risk (2/3/2025)    Housing Stability Vital Sign     Unable to Pay for Housing in the Last Year: No     Number of Times Moved in the Last Year: 45     Homeless in the Last Year: No       Family Hx:  No family history on file.    Review of Systems:   Review of Systems   Constitutional:  Negative for chills and fever.   HENT:  Negative for congestion and sore throat.    Respiratory:  Negative for cough, chest tightness and shortness of breath.    Cardiovascular:  Negative for chest pain and leg swelling.   Gastrointestinal:   Negative for abdominal pain, diarrhea, nausea and vomiting.   Genitourinary:  Negative for dysuria and hematuria.   Musculoskeletal:  Positive for arthralgias and joint swelling. Negative for back pain and neck pain.   Skin:  Negative for color change.   Neurological:  Negative for dizziness, weakness and numbness.         Physical Examination:    Visit Vitals  BP (!) 196/81   Pulse 56   Temp 36.2 °C (97.2 °F)   Resp 17      Physical Exam  Constitutional:       Appearance: Normal appearance. She is not ill-appearing.   Pulmonary:      Effort: Pulmonary effort is normal.   Abdominal:      General: Abdomen is flat. Bowel sounds are normal.   Musculoskeletal:         General: Swelling and tenderness present. Normal range of motion.      Cervical back: Normal range of motion and neck supple.      Comments: Right upper extremity:  Patient with limited ROM to RUE given acute humerus fracture. TTP over right shoulder. Sensation intact to extremity. Able to move fingers.     Right lower extremity:  Pain in right groin with flexion/extension of RLE. Able to SLR. Sensation intact to extremity. Able to dorsi/plantar flex.    Skin:     General: Skin is warm and dry.   Neurological:      Mental Status: She is alert and oriented to person, place, and time.   Psychiatric:         Mood and Affect: Mood normal.         Behavior: Behavior normal.         LABS:  CBC:   Lab Results   Component Value Date    WBC 8.1 02/03/2025    RBC 4.41 02/03/2025    HGB 12.8 02/03/2025    HCT 37.8 02/03/2025    MCV 86 02/03/2025    MCH 29.0 02/03/2025    MCHC 33.9 02/03/2025    RDW 12.5 02/03/2025     02/03/2025     CBC with Differential:    Lab Results   Component Value Date    WBC 8.1 02/03/2025    RBC 4.41 02/03/2025    HGB 12.8 02/03/2025    HCT 37.8 02/03/2025     02/03/2025    MCV 86 02/03/2025    MCH 29.0 02/03/2025    MCHC 33.9 02/03/2025    RDW 12.5 02/03/2025    NRBC 0.0 02/03/2025    LYMPHOPCT 13.5 02/03/2025    MONOPCT 7.3  "02/03/2025    EOSPCT 1.6 02/03/2025    BASOPCT 0.5 02/03/2025    MONOSABS 0.59 02/03/2025    LYMPHSABS 1.09 02/03/2025    EOSABS 0.13 02/03/2025    BASOSABS 0.04 02/03/2025     CMP:    Lab Results   Component Value Date     02/03/2025    K 3.4 (L) 02/03/2025     (H) 02/03/2025    CO2 22 02/03/2025    BUN 16 02/03/2025    CREATININE 0.98 02/03/2025    GLUCOSE 129 (H) 02/03/2025    PROT 6.6 02/03/2025    CALCIUM 8.8 02/03/2025    BILITOT 0.3 02/03/2025    ALKPHOS 74 02/03/2025    AST 19 02/03/2025    ALT 12 02/03/2025     BMP:    Lab Results   Component Value Date     02/03/2025    K 3.4 (L) 02/03/2025     (H) 02/03/2025    CO2 22 02/03/2025    BUN 16 02/03/2025    CREATININE 0.98 02/03/2025    CALCIUM 8.8 02/03/2025    GLUCOSE 129 (H) 02/03/2025     Magnesium:  Lab Results   Component Value Date    MG 2.01 02/03/2025     Troponin:  No results found for: \"TROPONINI\"    Imaging:  CT hip right wo IV contrast    Addendum Date: 2/3/2025    Interpreted By:  Jolie Mei, ADDENDUM: On further imaging review, there is a tiny crescentic shaped calcific density along the inferior margin of the pubic symphysis at the midline suggestive of an acute-subacute minimally displaced avulsion fracture.   Signed by: Jolie Mei 2/3/2025 11:38 AM   -------- ORIGINAL REPORT -------- Dictation workstation:   [id number]    Result Date: 2/3/2025  Interpreted By:  Jolie Mei, STUDY: CT HIP RIGHT WO IV CONTRAST;  2/3/2025 11:22 am   INDICATION: Signs/Symptoms:R hip pain, fall.   COMPARISON: Same day radiographs.   ACCESSION NUMBER(S): [id number]   ORDERING CLINICIAN: FABIO BASHIR   TECHNIQUE: CT imaging of the right hip was obtained without administration of intravenous contrast medium. Coronal and sagittal reformatted images were performed.   FINDINGS: OSSEOUS STRUCTURES: There is a minimal minimally displaced fracture of the anterior acetabulum extending into the base of the superior pubic ramus. No " additional fracture is identified. No dislocation. There is mild femoroacetabular joint space narrowing posteriorly. No significant hip joint effusion is visualized.   SOFT TISSUES: No subcutaneous or intramuscular hematoma. A small fat containing right inguinal hernia is noted. No acute abnormality is present in the imaged pelvis.       Minimally displaced fracture anterior acetabulum extending into base of superior pubic ramus.   MACRO: None   Signed by: Jolie Mei 2/3/2025 11:33 AM Dictation workstation:   [id number]    XR shoulder right 2+ views    Result Date: 2/3/2025  Interpreted By:  Arden Michael, STUDY: XR CLAVICLE RIGHT; XR HUMERUS RIGHT; XR SHOULDER RIGHT 2+ VIEWS; 2/3/2025 9:57 am   INDICATION: Signs/Symptoms:pain, fall; Signs/Symptoms:R arm pain fall; Signs/Symptoms:R shoulder pain.   COMPARISON: None.   ACCESSION NUMBER(S): [id number]; [id number]; [id number]   ORDERING CLINICIAN: FABIO BASHIR   FINDINGS: Bony structures:  Slightly impacted fracture at the humeral neck, and suspected nondisplaced fracture of the greater tuberosity. The remaining bony structures appear intact.   Joint spaces:  Maintained   Soft tissues:  Unremarkable without significant edema or radiodense foreign body   Other:  None significant       Humeral fracture.   MACRO: None   Signed by: Arden Michael 2/3/2025 10:15 AM Dictation workstation:   [id number]    XR humerus right    Result Date: 2/3/2025  Interpreted By:  Arden Michael, STUDY: XR CLAVICLE RIGHT; XR HUMERUS RIGHT; XR SHOULDER RIGHT 2+ VIEWS; 2/3/2025 9:57 am   INDICATION: Signs/Symptoms:pain, fall; Signs/Symptoms:R arm pain fall; Signs/Symptoms:R shoulder pain.   COMPARISON: None.   ACCESSION NUMBER(S): [id number]; [id number]; [id number]   ORDERING CLINICIAN: FABIO BASHIR   FINDINGS: Bony structures:  Slightly impacted fracture at the humeral neck, and suspected nondisplaced fracture of the greater tuberosity. The remaining bony structures appear  intact.   Joint spaces:  Maintained   Soft tissues:  Unremarkable without significant edema or radiodense foreign body   Other:  None significant       Humeral fracture.   MACRO: None   Signed by: Arden Michael 2/3/2025 10:15 AM Dictation workstation:   [id number]    XR clavicle right    Result Date: 2/3/2025  Interpreted By:  Arden Michael, STUDY: XR CLAVICLE RIGHT; XR HUMERUS RIGHT; XR SHOULDER RIGHT 2+ VIEWS; 2/3/2025 9:57 am   INDICATION: Signs/Symptoms:pain, fall; Signs/Symptoms:R arm pain fall; Signs/Symptoms:R shoulder pain.   COMPARISON: None.   ACCESSION NUMBER(S): [id number]; [id number]; [id number]   ORDERING CLINICIAN: FABIO BASHIR   FINDINGS: Bony structures:  Slightly impacted fracture at the humeral neck, and suspected nondisplaced fracture of the greater tuberosity. The remaining bony structures appear intact.   Joint spaces:  Maintained   Soft tissues:  Unremarkable without significant edema or radiodense foreign body   Other:  None significant       Humeral fracture.   MACRO: None   Signed by: Arden Michael 2/3/2025 10:15 AM Dictation workstation:   [id number]    XR chest 1 view    Result Date: 2/3/2025  Interpreted By:  Arden Michael, STUDY: XR CHEST 1 VIEW;  2/3/2025 9:57 am   INDICATION: Signs/Symptoms:fall.   COMPARISON: None.   ACCESSION NUMBER(S): [id number]   ORDERING CLINICIAN: FABIO BASHIR   FINDINGS: CARDIOMEDIASTINAL SILHOUETTE AND VASCULATURE:   Cardiac size:  Within normal limits. Aortic shadow:  Within normal limits.   Mediastinal contours: Within normal limits.   Pulmonary vasculature:  The central vasculature is unremarkable   LUNGS: Several linear opacities at the left lower lung medially may be due to atelectasis and/or fibrosis. The lungs otherwise are clear.   ABDOMEN AND OTHER FINDINGS: No remarkable upper abdominal findings.   BONES: Slightly impacted fracture of the right humeral neck is noted.       1.  Left lower lung atelectasis or fibrosis. 2. Right humeral  fracture.   Signed by: Arden Michael 2/3/2025 10:12 AM Dictation workstation:   [id number]    XR hip right with pelvis when performed 2 or 3 views    Result Date: 2/3/2025  Interpreted By:  Arden Michael, STUDY: XR HIP RIGHT WITH PELVIS WHEN PERFORMED 2 OR 3 VIEWS;  2/3/2025 9:57 am   INDICATION: Signs/Symptoms:R hip pain, fall.   COMPARISON: None.   ACCESSION NUMBER(S): [id number]   ORDERING CLINICIAN: FABIO BASHIR   FINDINGS: Bony structures:  Intact   Joint spaces:  Maintained   Soft tissues:  Unremarkable without significant edema or radiodense foreign body   Other:  None significant       No acute findings.   MACRO: None   Signed by: Arden Michael 2/3/2025 10:11 AM Dictation workstation:   [id number]    CT head wo IV contrast    Result Date: 2/3/2025  Interpreted By:  Israel Cruz, STUDY: CT HEAD WO IV CONTRAST; CT CERVICAL SPINE WO IV CONTRAST;  2/3/2025 9:28 am   INDICATION: Signs/Symptoms:fall head injury; Signs/Symptoms:fall.     COMPARISON: None   ACCESSION NUMBER(S): [id number]; [id number]   ORDERING CLINICIAN: FABIO BASHIR   TECHNIQUE: CT of the brain from the skull vertex to the skull base, without intravenous contrast   CT cervical spine from the craniocervical junction through the cervicothoracic junction without IV contrast, including sagittal and coronal reformatted images   FINDINGS: CT BRAIN   TRAUMA-RELATED   Brain Injury (BIG) guidelines CT values:   Skull fracture: No SDH (subdural hematoma): None detected EDH (epidural hematoma): None detected IPH (intraparenchymal hemorrhage): None detected SAH (subarachnoid hemorrhage): None detected IVH (intraventricular hemorrhage): None detected   Reference: Jono RICHARDS, Pérez RS, Sea M, et al. The BIG (brain injury guidelines) project: defining the management of traumatic brain injury by acute care surgeons. J Trauma Acute Care Surg. 2014;76:933o536.   OTHER   ACUTE INTRACRANIAL MASS EFFECT:  Negative   CT EVIDENCE OF ACUTE / SUBACUTE TERRITORIAL  ISCHEMIA:  Negative   VENTRICLES:  Normal caliber and configuration   OTHER BRAIN FINDINGS:  No additional findings to note   INCLUDED PARANASAL SINUSES: All clear   INCLUDED MASTOID AIR CELLS: All clear   SKULL:  No lytic or blastic lesion   EXTRACRANIAL SOFT TISSUES:  Scalp and occular globes grossly normal by CT   -------   CT CERVICAL SPINE   COUNTING REFERENCE: Craniocervical junction   CRANIOCERVICAL JUNCTION:  Intact   CERVICAL ALIGNMENT:  Rightward curvature is probably positional. No jumped facets are other acute traumatic alignment abnormality   ACUTE FRACTURE: Negative   AGGRESSIVE OSSEOUS LESION: Negative   BONY CANAL AND FORAMINA:  Disc osteophyte complexes at C3-4 and C5-6 contribute to mild and moderate bony foraminal and bony canal stenoses   PARASPINAL SOFT TISSUES:  No large acute hematoma or other acute posttraumatic finding   OTHER INCLUDED STRUCTURES:  No acute or contributory soft tissue abnormality in the other cervical and upper thoracic soft tissues       NO ACUTE INTRACRANIAL PROCESS. SKULL INTACT   NO ACUTE FRACTURE OR SUBLUXATION IN THE CERVICAL SPINE   THIS REPORT SERVES AS THE DIAGNOSTIC INTERPRETATION FOR TWO EXAMS PERFORMED CONCURRENTLY: CT BRAIN WITHOUT IV CONTRAST AND CT CERVICAL SPINE WITHOUT IV CONTRAST   MACRO: None   Signed by: Israel Cruz 2/3/2025 9:46 AM Dictation workstation:   [id number]    CT cervical spine wo IV contrast    Result Date: 2/3/2025  Interpreted By:  Israel Cruz, STUDY: CT HEAD WO IV CONTRAST; CT CERVICAL SPINE WO IV CONTRAST;  2/3/2025 9:28 am   INDICATION: Signs/Symptoms:fall head injury; Signs/Symptoms:fall.     COMPARISON: None   ACCESSION NUMBER(S): [id number]; [id number]   ORDERING CLINICIAN: FABIO BASHIR   TECHNIQUE: CT of the brain from the skull vertex to the skull base, without intravenous contrast   CT cervical spine from the craniocervical junction through the cervicothoracic junction without IV contrast, including sagittal and coronal  reformatted images   FINDINGS: CT BRAIN   TRAUMA-RELATED   Brain Injury (BIG) guidelines CT values:   Skull fracture: No SDH (subdural hematoma): None detected EDH (epidural hematoma): None detected IPH (intraparenchymal hemorrhage): None detected SAH (subarachnoid hemorrhage): None detected IVH (intraventricular hemorrhage): None detected   Reference: Jono RICHARDS, Pérez RS, Sea MARSH, et al. The BIG (brain injury guidelines) project: defining the management of traumatic brain injury by acute care surgeons. J Trauma Acute Care Surg. 2014;76:336v474.   OTHER   ACUTE INTRACRANIAL MASS EFFECT:  Negative   CT EVIDENCE OF ACUTE / SUBACUTE TERRITORIAL ISCHEMIA:  Negative   VENTRICLES:  Normal caliber and configuration   OTHER BRAIN FINDINGS:  No additional findings to note   INCLUDED PARANASAL SINUSES: All clear   INCLUDED MASTOID AIR CELLS: All clear   SKULL:  No lytic or blastic lesion   EXTRACRANIAL SOFT TISSUES:  Scalp and occular globes grossly normal by CT   -------   CT CERVICAL SPINE   COUNTING REFERENCE: Craniocervical junction   CRANIOCERVICAL JUNCTION:  Intact   CERVICAL ALIGNMENT:  Rightward curvature is probably positional. No jumped facets are other acute traumatic alignment abnormality   ACUTE FRACTURE: Negative   AGGRESSIVE OSSEOUS LESION: Negative   BONY CANAL AND FORAMINA:  Disc osteophyte complexes at C3-4 and C5-6 contribute to mild and moderate bony foraminal and bony canal stenoses   PARASPINAL SOFT TISSUES:  No large acute hematoma or other acute posttraumatic finding   OTHER INCLUDED STRUCTURES:  No acute or contributory soft tissue abnormality in the other cervical and upper thoracic soft tissues       NO ACUTE INTRACRANIAL PROCESS. SKULL INTACT   NO ACUTE FRACTURE OR SUBLUXATION IN THE CERVICAL SPINE   THIS REPORT SERVES AS THE DIAGNOSTIC INTERPRETATION FOR TWO EXAMS PERFORMED CONCURRENTLY: CT BRAIN WITHOUT IV CONTRAST AND CT CERVICAL SPINE WITHOUT IV CONTRAST   MACRO: None   Signed by: Israel Cruz  2/3/2025 9:46 AM Dictation workstation:   [id number]    ECG 12 lead    Result Date: 2/3/2025  Normal sinus rhythm Nonspecific ST abnormality Abnormal ECG When compared with ECG of 03-FEB-2025 09:04, (unconfirmed) No significant change was found        Assessment:  74 year old female patient admitted to hospital s/p fall at home sustaining right humerus and acetabulum extending into pubic rami fracture.     Imaging independently reviewed and consistent with above findings.    Will treat non operatively with conservative treatments.    NWB to right upper extremity. Sling to be worn to at all times with the exception of skin care and hygiene. No ROM to shoulder.     Okay to WBAT to right lower extremity.    Continue pain control and ice to extremity    Follow up in outpatient orthopedic clinic in 2-3 weeks. Will have repeat imaging at that time.     Thank you for this consultation and allowing us to be a part of this patient care. Ortho to sign off. Please do not hesitate to reach out with any further questions.     Patient Active Problem List   Diagnosis    Fall at home, initial encounter          Plan:  Non operative management  Continue paint control and ice to extremity  PT/OT: NWB to right upper extremity. Sling to be worn to at all times with the exception of skin care and hygiene. No ROM to shoulder. Okay to WBAT to right lower extremity.  Follow up in outpatient orthopedic clinic in 2-3 weeks.      Electronically signed by HARPREET Childers on 2/3/2025 at 3:27 PM     Assessment/plan:  1.  Nonoperative management right proximal humerus fracture  2.  Nonoperative management right pubic rami fracture/acetabular fracture    1.  Nonweightbearing right upper extremity sling.  Protected weightbearing as tolerated right lower extremity  2.  Plan for orthopedic follow-up for repeat x-rays  Discussed with patient the risk of nonoperative care include malunion, nonunion shift deformity need for corrective  surgeries or.  She accepts this.    Patient was seen and evaluated independently in a face-to-face encounter.  I performed a history and physical examination, discussed pertinent diagnostic studies if indicated, and discussed diagnosis and management strategies with both the patient and the mid-level provider. We reviewed the history, exam and imaging associated with the patient encounter.  We have discussed with the patient the plan of care.  Patient was seen in conjunction with our orthopedic nurse practitioner/physicians assistant.  I agree with their findings, assessment and clinical plan of care.

## 2025-02-03 NOTE — DISCHARGE INSTRUCTIONS
Non weight bearing to right upper extremity. Sling to be worn to at all times with the exception of skin care and hygiene. No range of motion to shoulder.   Okay to Weight bear as tolerated to right lower extremity.  Follow up in outpatient orthopedic clinic in 2-3 weeks. You will have repeat imaging done at that time. Please call the office to schedule appointment.

## 2025-02-03 NOTE — CARE PLAN
The patient's goals for the shift include      The clinical goals for the shift include  pain control
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
## 2025-02-04 LAB
ANION GAP SERPL CALC-SCNC: 8 MMOL/L (ref 10–20)
BASOPHILS # BLD AUTO: 0.02 X10*3/UL (ref 0–0.1)
BASOPHILS NFR BLD AUTO: 0.4 %
BUN SERPL-MCNC: 18 MG/DL (ref 6–23)
CALCIUM SERPL-MCNC: 8.8 MG/DL (ref 8.6–10.3)
CHLORIDE SERPL-SCNC: 108 MMOL/L (ref 98–107)
CO2 SERPL-SCNC: 29 MMOL/L (ref 21–32)
CREAT SERPL-MCNC: 1.09 MG/DL (ref 0.5–1.05)
EGFRCR SERPLBLD CKD-EPI 2021: 54 ML/MIN/1.73M*2
EOSINOPHIL # BLD AUTO: 0.1 X10*3/UL (ref 0–0.4)
EOSINOPHIL NFR BLD AUTO: 1.8 %
ERYTHROCYTE [DISTWIDTH] IN BLOOD BY AUTOMATED COUNT: 13 % (ref 11.5–14.5)
GLUCOSE SERPL-MCNC: 94 MG/DL (ref 74–99)
HCT VFR BLD AUTO: 35.9 % (ref 36–46)
HGB BLD-MCNC: 11.7 G/DL (ref 12–16)
HOLD SPECIMEN: NORMAL
IMM GRANULOCYTES # BLD AUTO: 0.01 X10*3/UL (ref 0–0.5)
IMM GRANULOCYTES NFR BLD AUTO: 0.2 % (ref 0–0.9)
LYMPHOCYTES # BLD AUTO: 1.37 X10*3/UL (ref 0.8–3)
LYMPHOCYTES NFR BLD AUTO: 24.2 %
MAGNESIUM SERPL-MCNC: 2.31 MG/DL (ref 1.6–2.4)
MCH RBC QN AUTO: 28.6 PG (ref 26–34)
MCHC RBC AUTO-ENTMCNC: 32.6 G/DL (ref 32–36)
MCV RBC AUTO: 88 FL (ref 80–100)
MONOCYTES # BLD AUTO: 0.56 X10*3/UL (ref 0.05–0.8)
MONOCYTES NFR BLD AUTO: 9.9 %
NEUTROPHILS # BLD AUTO: 3.59 X10*3/UL (ref 1.6–5.5)
NEUTROPHILS NFR BLD AUTO: 63.5 %
NRBC BLD-RTO: 0 /100 WBCS (ref 0–0)
PLATELET # BLD AUTO: 177 X10*3/UL (ref 150–450)
POTASSIUM SERPL-SCNC: 4.2 MMOL/L (ref 3.5–5.3)
RBC # BLD AUTO: 4.09 X10*6/UL (ref 4–5.2)
SODIUM SERPL-SCNC: 141 MMOL/L (ref 136–145)
WBC # BLD AUTO: 5.7 X10*3/UL (ref 4.4–11.3)

## 2025-02-04 PROCEDURE — 97161 PT EVAL LOW COMPLEX 20 MIN: CPT | Mod: GP | Performed by: PHYSICAL THERAPIST

## 2025-02-04 PROCEDURE — 97165 OT EVAL LOW COMPLEX 30 MIN: CPT | Mod: GO

## 2025-02-04 PROCEDURE — 80048 BASIC METABOLIC PNL TOTAL CA: CPT | Performed by: STUDENT IN AN ORGANIZED HEALTH CARE EDUCATION/TRAINING PROGRAM

## 2025-02-04 PROCEDURE — 2500000004 HC RX 250 GENERAL PHARMACY W/ HCPCS (ALT 636 FOR OP/ED): Performed by: STUDENT IN AN ORGANIZED HEALTH CARE EDUCATION/TRAINING PROGRAM

## 2025-02-04 PROCEDURE — 2500000001 HC RX 250 WO HCPCS SELF ADMINISTERED DRUGS (ALT 637 FOR MEDICARE OP): Performed by: STUDENT IN AN ORGANIZED HEALTH CARE EDUCATION/TRAINING PROGRAM

## 2025-02-04 PROCEDURE — 85025 COMPLETE CBC W/AUTO DIFF WBC: CPT | Performed by: STUDENT IN AN ORGANIZED HEALTH CARE EDUCATION/TRAINING PROGRAM

## 2025-02-04 PROCEDURE — 1210000001 HC SEMI-PRIVATE ROOM DAILY

## 2025-02-04 PROCEDURE — 36415 COLL VENOUS BLD VENIPUNCTURE: CPT | Performed by: STUDENT IN AN ORGANIZED HEALTH CARE EDUCATION/TRAINING PROGRAM

## 2025-02-04 PROCEDURE — 99232 SBSQ HOSP IP/OBS MODERATE 35: CPT | Performed by: STUDENT IN AN ORGANIZED HEALTH CARE EDUCATION/TRAINING PROGRAM

## 2025-02-04 PROCEDURE — 2500000002 HC RX 250 W HCPCS SELF ADMINISTERED DRUGS (ALT 637 FOR MEDICARE OP, ALT 636 FOR OP/ED): Performed by: STUDENT IN AN ORGANIZED HEALTH CARE EDUCATION/TRAINING PROGRAM

## 2025-02-04 PROCEDURE — 83735 ASSAY OF MAGNESIUM: CPT | Performed by: STUDENT IN AN ORGANIZED HEALTH CARE EDUCATION/TRAINING PROGRAM

## 2025-02-04 PROCEDURE — 2500000005 HC RX 250 GENERAL PHARMACY W/O HCPCS: Performed by: STUDENT IN AN ORGANIZED HEALTH CARE EDUCATION/TRAINING PROGRAM

## 2025-02-04 RX ADMIN — FAMOTIDINE 40 MG: 20 TABLET, FILM COATED ORAL at 15:54

## 2025-02-04 RX ADMIN — LIDOCAINE 4% 1 PATCH: 40 PATCH TOPICAL at 08:02

## 2025-02-04 RX ADMIN — MORPHINE SULFATE 2 MG: 2 INJECTION, SOLUTION INTRAMUSCULAR; INTRAVENOUS at 10:14

## 2025-02-04 RX ADMIN — HYDROXYZINE HYDROCHLORIDE 50 MG: 25 TABLET ORAL at 14:30

## 2025-02-04 RX ADMIN — HYDROXYZINE HYDROCHLORIDE 50 MG: 25 TABLET ORAL at 07:56

## 2025-02-04 RX ADMIN — ESCITALOPRAM OXALATE 30 MG: 10 TABLET, FILM COATED ORAL at 08:03

## 2025-02-04 RX ADMIN — LOSARTAN POTASSIUM 50 MG: 50 TABLET, FILM COATED ORAL at 08:03

## 2025-02-04 RX ADMIN — MORPHINE SULFATE 2 MG: 2 INJECTION, SOLUTION INTRAMUSCULAR; INTRAVENOUS at 14:26

## 2025-02-04 RX ADMIN — MORPHINE SULFATE 2 MG: 2 INJECTION, SOLUTION INTRAMUSCULAR; INTRAVENOUS at 05:53

## 2025-02-04 RX ADMIN — NIFEDIPINE 30 MG: 30 TABLET, FILM COATED, EXTENDED RELEASE ORAL at 06:03

## 2025-02-04 RX ADMIN — OXYCODONE HYDROCHLORIDE 5 MG: 5 TABLET ORAL at 07:56

## 2025-02-04 RX ADMIN — ENOXAPARIN SODIUM 40 MG: 40 INJECTION SUBCUTANEOUS at 15:54

## 2025-02-04 RX ADMIN — OXYCODONE HYDROCHLORIDE 5 MG: 5 TABLET ORAL at 16:00

## 2025-02-04 RX ADMIN — EZETIMIBE 10 MG: 10 TABLET ORAL at 08:03

## 2025-02-04 ASSESSMENT — COGNITIVE AND FUNCTIONAL STATUS - GENERAL
DAILY ACTIVITIY SCORE: 14
TURNING FROM BACK TO SIDE WHILE IN FLAT BAD: TOTAL
EATING MEALS: A LITTLE
MOVING FROM LYING ON BACK TO SITTING ON SIDE OF FLAT BED WITH BEDRAILS: A LOT
DRESSING REGULAR LOWER BODY CLOTHING: A LOT
MOVING FROM LYING ON BACK TO SITTING ON SIDE OF FLAT BED WITH BEDRAILS: A LOT
WALKING IN HOSPITAL ROOM: TOTAL
HELP NEEDED FOR BATHING: A LOT
PERSONAL GROOMING: A LITTLE
CLIMB 3 TO 5 STEPS WITH RAILING: TOTAL
DRESSING REGULAR LOWER BODY CLOTHING: TOTAL
TURNING FROM BACK TO SIDE WHILE IN FLAT BAD: A LOT
MOVING TO AND FROM BED TO CHAIR: A LOT
WALKING IN HOSPITAL ROOM: TOTAL
STANDING UP FROM CHAIR USING ARMS: A LOT
DRESSING REGULAR UPPER BODY CLOTHING: A LOT
HELP NEEDED FOR BATHING: A LOT
TOILETING: A LOT
STANDING UP FROM CHAIR USING ARMS: TOTAL
MOBILITY SCORE: 7
PERSONAL GROOMING: A LITTLE
MOVING TO AND FROM BED TO CHAIR: TOTAL
MOBILITY SCORE: 10
CLIMB 3 TO 5 STEPS WITH RAILING: TOTAL
DRESSING REGULAR UPPER BODY CLOTHING: A LOT
TOILETING: A LOT
DAILY ACTIVITIY SCORE: 14

## 2025-02-04 ASSESSMENT — PAIN DESCRIPTION - ORIENTATION
ORIENTATION: RIGHT

## 2025-02-04 ASSESSMENT — PAIN SCALES - GENERAL
PAINLEVEL_OUTOF10: 10 - WORST POSSIBLE PAIN
PAINLEVEL_OUTOF10: 0 - NO PAIN
PAINLEVEL_OUTOF10: 8
PAINLEVEL_OUTOF10: 10 - WORST POSSIBLE PAIN
PAINLEVEL_OUTOF10: 6
PAINLEVEL_OUTOF10: 10 - WORST POSSIBLE PAIN
PAINLEVEL_OUTOF10: 6
PAINLEVEL_OUTOF10: 10 - WORST POSSIBLE PAIN
PAINLEVEL_OUTOF10: 0 - NO PAIN

## 2025-02-04 ASSESSMENT — PAIN DESCRIPTION - LOCATION
LOCATION: SHOULDER

## 2025-02-04 ASSESSMENT — ACTIVITIES OF DAILY LIVING (ADL): BATHING_ASSISTANCE: MODERATE

## 2025-02-04 ASSESSMENT — PAIN SCALES - WONG BAKER: WONGBAKER_NUMERICALRESPONSE: NO HURT

## 2025-02-04 ASSESSMENT — PAIN SCALES - PAIN ASSESSMENT IN ADVANCED DEMENTIA (PAINAD): TOTALSCORE: MEDICATION (SEE MAR)

## 2025-02-04 ASSESSMENT — PAIN - FUNCTIONAL ASSESSMENT: PAIN_FUNCTIONAL_ASSESSMENT: 0-10

## 2025-02-04 NOTE — CARE PLAN
Over the shift, the patient did make progress toward the following goals.    The clinical goals for the shift include pain management

## 2025-02-04 NOTE — PROGRESS NOTES
Occupational Therapy    Evaluation    Patient Name: Mana Avila  MRN: [id number]  : 1950  Today's Date: 25  Time Calculation  Start Time: 937  Stop Time: 958  Time Calculation (min): 21 min     902/902-A    Assessment:  OT Assessment: pt presents with decreased safety with ADLS and functional transfers will benefit from con't skilled OT to address impairments  Prognosis: Good  Barriers to Discharge Home: Caregiver assistance, Physical needs  Caregiver Assistance: Caregiver assistance needed per identified barriers - however, level of patient's required assistance exceeds assistance available at home  Physical Needs: Ambulating household distances limited by function/safety, 24hr mobility assistance needed, 24hr ADL assistance needed, High falls risk due to function or environment  End of Session Patient Position: Bed, 2 rail up, Alarm on  OT Assessment Results: Decreased ADL status, Decreased endurance, Decreased functional mobility  Prognosis: Good    Plan:  Treatment Interventions: ADL retraining, Functional transfer training, Endurance training, Patient/family training  OT Frequency: 2 times per week  OT Discharge Recommendations: Moderate intensity level of continued care  OT Recommended Transfer Status: Maximum assist, Assist of 2  OT - OK to Discharge: Yes  Treatment Interventions: ADL retraining, Functional transfer training, Endurance training, Patient/family training  Subjective     Current Problem:  1. Fall, initial encounter        2. Closed supracondylar fracture of right humerus, initial encounter        3. Closed displaced fracture of right acetabulum, unspecified portion of acetabulum, initial encounter (Multi)                General:   OT Received On: 25  General  Reason for Referral: ADL impairment  Referred By: PT/OT 2/3/25 Dylan GRUBERE 2/3/25 Doug  Past Medical History Relevant to Rehab: HTN, Arthritis, Appey  Family/Caregiver Present:  (dtr and mali present for  mobility portion of eval)  Co-Treatment: PT  Co-Treatment Reason: to maximize patient/staff safety  Patient Position Received: Bed, 3 rail up, Alarm on  General Comment: To ED 2/3/25 secondary to slip on ice and fell forward. CT 2/3/25 Head (-); Cervical spine (-); CXR 2/3/25 LLL atelectasis, Right humeral fracture; XR 2/3/25 Right clavicle, Shoulder, humerus Right humeral fracture; Right hip (-); CT 2/3/25 Right hip minimally displaced acetabular extending to base of superior pubic ramus    Precautions:  UE Weight Bearing Status:  (R UE NWB)  LE Weight Bearing Status:  (R LE WBAT)  Medical Precautions: Fall precautions  Prosthesis/Orthosis Used:  (Simple sling to be worn R UE at all times except hygiene)           Pain:  Pain Assessment  0-10 (Numeric) Pain Score: 10 - Worst possible pain  Pain Type: Acute pain  Pain Location: Shoulder (hip)  Pain Orientation: Right  Objective     Cognition:  Overall Cognitive Status: Within Functional Limits (pt tearful, in pain with mobility)             Home Living:  Home Living Comments: Per patient report resides with spouse in 1 story with basement (laundry0 no handrail. 4 steps with handrail on both sides. Tub shower with seat no grab bar.    Prior Function:  Hand Dominance: Left  Prior Function Comments: Per patient report independence in mobiltiy, ADLs and IADLS without assistive device. Denies any other falls. Drives.           Activities of Daily Living:   Eating Assistance: Independent  Grooming Assistance: Minimal  Bathing Assistance: Moderate  UE Dressing Assistance: Minimal  LE Dressing Assistance: Maximal  Toileting Assistance with Device: Maximal  Functional Assistance: Maximal                         Activity Tolerance:  Endurance:  (poor)         Bed Mobility/Transfers: Bed Mobility  Bed Mobility:  (Supine > sit with HOB + 2 maximal assist increased pain with movement. Sit > supine dependent x 2. Dependent in scooting up toward HOB)  Transfers  Transfer:  (Sit >  stand at bed level + 2 maximal assist with knee blocked Patient c/o increased pain. Stand > sit encouraged to take deep breath prior to sitting and exhale on descent. Severe pain with flexing hip/knee RLE.)                Balance:  Static Sitting: good-  Dynamic Sitting: fair   Static Standing: fair -  Dynamic Standing: poor         Vision:Vision - Basic Assessment  Current Vision: No visual deficits        Sensation:  Light Touch: No apparent deficits    Strength:  Strength Comments: B UE 4/5 throughout        Extremities: RUE   RUE :  (Not tested due to recent humeral fracture, encouraged R wrist and hand ROM) and LUE   LUE: Within Functional Limits    Outcome Measures: [facility] Daily Activity  Putting on and taking off regular lower body clothing: Total  Bathing (including washing, rinsing, drying): A lot  Putting on and taking off regular upper body clothing: A lot  Toileting, which includes using toilet, bedpan or urinal: A lot  Taking care of personal grooming such as brushing teeth: A little  Eating Meals: None  Daily Activity - Total Score: 14    Education Documentation  ADL Training, taught by Mary Donnelly, OT at 2/4/2025 12:50 PM.  Learner: Patient  Readiness: Acceptance  Method: Explanation  Response: Verbalizes Understanding, Needs Reinforcement               Goals:  Encounter Problems       Encounter Problems (Active)       OT Goals       pt will complete R elbow, wrist and hand ROM indep (Progressing)       Start:  02/04/25    Expected End:  02/18/25            Pt will bathe/dress UB with min A (Progressing)       Start:  02/04/25    Expected End:  02/18/25            Pt will dress LB with min A (Progressing)       Start:  02/04/25    Expected End:  02/18/25            Pt will transfer to bed, chair ,toilet with min A (Progressing)       Start:  02/04/25    Expected End:  02/18/25

## 2025-02-04 NOTE — PROGRESS NOTES
Physical Therapy    Physical Therapy    Physical Therapy Evaluation    Patient Name: Mana Avila  MRN: [id number]  Today's Date: 2/4/2025   Time Calculation  Start Time: 0936  Stop Time: 0950  Time Calculation (min): 14 min  902/902-A    Assessment/Plan   PT Assessment  PT Assessment Results: Decreased strength, Decreased endurance, Impaired balance, Decreased mobility, Decreased safety awareness  Rehab Prognosis: Good  Barriers to Discharge Home:  (Requiring extensive assistance for moboiltiy at this time)  Evaluation/Treatment Tolerance: Patient limited by pain  Assessment Comment: Patient will benefit from additional PT to increase mobiltiy and activity tolerance  End of Session Patient Position: Bed, 2 rail up, Alarm on  IP OR SWING BED PT PLAN  Inpatient or Swing Bed: Inpatient  PT Plan  Treatment/Interventions: Bed mobility, Transfer training, Gait training, Balance training, Strengthening, Therapeutic exercise, Therapeutic activity  PT Frequency: 4 times per week  PT Discharge Recommendations:  (Moderate intenstiy at moderate to high frequency in 24/7 setting.)  Equipment Recommended upon Discharge:  (TBD)  PT Recommended Transfer Status: Assist x2  PT - OK to Discharge:  (once deemed medically appropriate with cointinued PT)    Subjective         General Visit Information:  General  Reason for Referral: Impaired mobility  Referred By: PT/OT 2/3/25 Dylan JAQUEZAT RLE 2/3/25 Doug  Past Medical History Relevant to Rehab: HTN, Arthritis, Appey  Co-Treatment: OT  Co-Treatment Reason: to maximize patient/staff safety  Patient Position Received: Bed, 3 rail up, Alarm on  General Comment: To ED 2/3/25 secondary to slip on iceand fell forward. CT 2/3/25 Head (-); Cervical spine (-); CXR 2/3/25 LLL atelectasis, Right humeral fracture; XR 2/3/25 Right clavicle, Shoulder, humerus Right humeral fracture; Right hip (-); CT 2/3/25 Right hip minimally displaced acetabular extending to base of superior pubic ramus    Home  Living:  Home Living  Home Living Comments: Per patient report resides with spouse in 1 story with basement (laundry0 no handrail. 4 steps with handrail on both sides. Tub shower iwth seat no grab bar.    Prior Level of Function:  Prior Function Per Pt/Caregiver Report  Prior Function Comments: Per patient report reports independence in mobiltiy, ADLs and IADLS without assistive device. Denies any other falls. Drives.    Precautions:  Precautions  UE Weight Bearing Status:  (RUE NWB)  LE Weight Bearing Status:  (RLE WBAT)  Medical Precautions: Fall precautions  Prosthesis/Orthosis Used:  (Sling to be worn at all times except hygiene)       Objective     Pain:  Pain Assessment  0-10 (Numeric) Pain Score: 10 - Worst possible pain  Pain Type: Acute pain  Pain Location: Shoulder (hip)  Pain Orientation: Right    Cognition:  Cognition  Overall Cognitive Status: Within Functional Limits    General Assessments:  General Observation  General Observation: Patient lying in bed. Simple sling in place RUE. Agreeable tot PT/OT  Daughter and granddaughter arrived during evaluation.   Activity Tolerance  Endurance:  (Poor)                 Static Sitting Balances: Fair +  Dynamic Sitting Balance Fair -  Static Standing Balance: Fair -  Dynamic Standing Balance: Poor    Functional Assessments:     Bed Mobility  Bed Mobility:  (Supine > sit with HOB + 2 maximal assist increased pain with movement. Sit > supine dependent x 2. Dependent in scootoing up toward HOB)  Transfers  Transfer:  (Sit > stand at bed level + 2 maximal assist with knee blocked Patient c/o increased pain. Stand > sit encouraged to take deep breath prior to sitting and exhale on descent. Severe pain with flexing hip/knee RLE.)  Ambulation/Gait Training  Ambulation/Gait Training Performed:  (Patient only able to take 1 very small side step toward HOB. Difficulty moving RLE. and srabilizing through RLE to move LLEL. + 2maximal assist. Only tolerates the 1 small step.  Returned to bed.)          Extremity/Trunk Assessments:  RUE   RUE :  (Note tested due to recent humeral fracture)  LUE   LUE: Within Functional Limits  RLE   RLE :  (AROM Hip/knee/ankle WFL MMT ankle dorsiflexion 4/5)  LLE   LLE :  (AROM Hip/knee/ankle WFL MMT 4/5 grossly)    Outcome Measures:     [facility] Basic Mobility  Turning from your back to your side while in a flat bed without using bedrails: A lot  Moving from lying on your back to sitting on the side of a flat bed without using bedrails: A lot  Moving to and from bed to chair (including a wheelchair): A lot  Standing up from a chair using your arms (e.g. wheelchair or bedside chair): A lot  To walk in hospital room: Total  Climbing 3-5 steps with railing: Total  Basic Mobility - Total Score: 10        Goals:  Encounter Problems       Encounter Problems (Active)       PT Problem       Bed mobility supine <> sit + 1 moderate assist  (Progressing)       Start:  02/04/25    Expected End:  02/18/25            Transfers sit <> stand /bed <> chair with QC v pyramid walker + 1 moderate assist (Progressing)       Start:  02/04/25    Expected End:  02/18/25            Patient to ambulate 10' with QC v pyramid walker + 1 moderate assist  (Not Progressing)       Start:  02/04/25    Expected End:  02/18/25                 Education Documentation  Precautions, taught by Eneida Ag PT at 2/4/2025 12:39 PM.  Learner: Family, Patient  Readiness: Acceptance  Method: Explanation, Demonstration  Response: Needs Reinforcement    Mobility Training, taught by Eneida Ag PT at 2/4/2025 12:39 PM.  Learner: Family, Patient  Readiness: Acceptance  Method: Explanation, Demonstration  Response: Needs Reinforcement

## 2025-02-04 NOTE — PROGRESS NOTES
02/04/25 0937   Discharge Planning   Living Arrangements Spouse/significant other   Support Systems Spouse/significant other   Assistance Needed PTA -lives with spouse in 1 level home, 5 [address]ONNY. Laundry in basement with 4 steps down and handrail on both sides. Tub shower with seat but no grab bar. Independent for mobility, ADLs, and IADLS. No AD used. Drives.   Type of Residence Private residence   Number of Stairs to Enter Residence 5   Number of Stairs Within Residence 4   Do you have animals or pets at home? Yes   Home or Post Acute Services Post acute facilities (Rehab/SNF/etc)   Type of Post Acute Facility Services Skilled nursing   Expected Discharge Disposition SNF   Does the patient need discharge transport arranged? Yes   RoundTrip coordination needed? Yes   Patient Choice   Provider Choice list and CMS website (https://medicare.gov/care-compare#search) for post-acute Quality and Resource Measure Data were provided and reviewed with: Patient;Family   Patient / Family choosing to utilize agency / facility established prior to hospitalization No   Intensity of Service   Intensity of Service 0-30 min     Patient had a fall on ice and found with fractures pelvis and right humerus. Ortho plans for non surgical treatment and therapy. NWB to right arm and WBAT to right leg. PT/OT [facility] 10/14, recommending moderate level intensity needs at dc. Pt is very limited with movement d/t severe pain. TCC spoke with pt and family regarding SNF vs AR at dc. They felt the pt was in too much pain for AR. Choice list for SNF provided from [facility]. They requested time to look the list over. Pt with Medicare insurance and is inpatient as of today. Will need 3 midnight stay and can dc 2/7 if medically ready.

## 2025-02-04 NOTE — PROGRESS NOTES
ASSESSMENT & PLAN:       Fall  Right humerus fracture  Right acetabular to superior pubic ramus fracture  - Presented from home after slipping on ice, landing on her abdomen/chest without head trauma or loss of consciousness  - Found to have right humerus and right acetabular fracture  - Orthopedic surgery consulted; Non-operative management advised. NWB to RUE w/ sling worn at all times & no ROM of shoulder. WBAT to lower extremities. 2-3 week outpt follow-up advised   - PT/OT eval pending. Patient may require SNF placement.   - Pain management as ordered     Essential hypertension  Hypertensive urgency  - Elevated blood pressures in setting of pain from fall on presentation  - Home medications were resumed  - BP control appears improved w/ resumption and pain control    VTE Prophylaxis: Lovenox SubQ    Disposition: TBD pending PT/OT eval. SNF vs home w/ HHC.     Mina Luis MD    SUBJECTIVE     Patient had no acute events overnight.  Endorsing pain in her hip and right arm but feels that is well-managed.  Denies any constipation.  Further ROS was unremarkable.      OBJECTIVE:       Last Recorded Vitals:  Vitals:    02/03/25 1924 02/04/25 0018 02/04/25 0500 02/04/25 0759   BP: 154/69 143/64 145/74 148/67   BP Location: Left arm Left arm Left arm    Patient Position: Lying Lying Lying Lying   Pulse: 58 62 60 61   Resp: 16 16 16 12   Temp: 36.3 °C (97.3 °F) 36.5 °C (97.7 °F) 36.2 °C (97.2 °F)    TempSrc: Temporal Temporal Temporal    SpO2: 90% 90% 93% 92%   Weight:       Height:           Last I/O:  No intake/output data recorded.    Physical Exam:  General: Well-developed elderly female in mild distress  HEENT: Clear sclera, EOMI, trachea midline, moist mucous membranes  Abdomen: Soft, nontender, nondistended  Extremities: RUE in sling  Neurological:no dysarthria, cranial nerves grossly intact, awake, alert  Psychiatric: Appropriate mood and affect  Skin: Warm, dry    Inpatient Medications:  enoxaparin, 40 mg,  subcutaneous, q24h  escitalopram, 30 mg, oral, Daily  ezetimibe, 10 mg, oral, Daily  famotidine, 40 mg, oral, Nightly  lidocaine, 1 patch, transdermal, Daily  losartan, 50 mg, oral, Daily  NIFEdipine ER, 30 mg, oral, Daily        PRN Medications  PRN medications: acetaminophen **OR** acetaminophen **OR** acetaminophen, hydrOXYzine HCL, morphine, ondansetron, oxyCODONE, polyethylene glycol    Continuous Medications:         LABS AND IMAGING:     Labs:  Results for orders placed or performed during the hospital encounter of 02/03/25 (from the past 24 hours)   Troponin, High Sensitivity, 1 Hour   Result Value Ref Range    Troponin I, High Sensitivity 5 0 - 13 ng/L   Basic Metabolic Panel   Result Value Ref Range    Glucose 94 74 - 99 mg/dL    Sodium 141 136 - 145 mmol/L    Potassium 4.2 3.5 - 5.3 mmol/L    Chloride 108 (H) 98 - 107 mmol/L    Bicarbonate 29 21 - 32 mmol/L    Anion Gap 8 (L) 10 - 20 mmol/L    Urea Nitrogen 18 6 - 23 mg/dL    Creatinine 1.09 (H) 0.50 - 1.05 mg/dL    eGFR 54 (L) >60 mL/min/1.73m*2    Calcium 8.8 8.6 - 10.3 mg/dL   CBC and Auto Differential   Result Value Ref Range    WBC 5.7 4.4 - 11.3 x10*3/uL    nRBC 0.0 0.0 - 0.0 /100 WBCs    RBC 4.09 4.00 - 5.20 x10*6/uL    Hemoglobin 11.7 (L) 12.0 - 16.0 g/dL    Hematocrit 35.9 (L) 36.0 - 46.0 %    MCV 88 80 - 100 fL    MCH 28.6 26.0 - 34.0 pg    MCHC 32.6 32.0 - 36.0 g/dL    RDW 13.0 11.5 - 14.5 %    Platelets 177 150 - 450 x10*3/uL    Neutrophils % 63.5 40.0 - 80.0 %    Immature Granulocytes %, Automated 0.2 0.0 - 0.9 %    Lymphocytes % 24.2 13.0 - 44.0 %    Monocytes % 9.9 2.0 - 10.0 %    Eosinophils % 1.8 0.0 - 6.0 %    Basophils % 0.4 0.0 - 2.0 %    Neutrophils Absolute 3.59 1.60 - 5.50 x10*3/uL    Immature Granulocytes Absolute, Automated 0.01 0.00 - 0.50 x10*3/uL    Lymphocytes Absolute 1.37 0.80 - 3.00 x10*3/uL    Monocytes Absolute 0.56 0.05 - 0.80 x10*3/uL    Eosinophils Absolute 0.10 0.00 - 0.40 x10*3/uL    Basophils Absolute 0.02 0.00 -  0.10 x10*3/uL   Magnesium   Result Value Ref Range    Magnesium 2.31 1.60 - 2.40 mg/dL   SST TOP   Result Value Ref Range    Extra Tube Hold for add-ons.         Imaging:  CT hip right wo IV contrast  Addendum: Interpreted By:  Jolie Mei,    ADDENDUM:   On further imaging review, there is a tiny crescentic shaped calcific   density along the inferior margin of the pubic symphysis at the   midline suggestive of an acute-subacute minimally displaced avulsion   fracture.        Signed by: Jolie Mei 2/3/2025 11:38 AM        -------- ORIGINAL REPORT --------   Dictation workstation:   [id number]  Narrative: Interpreted By:  Jolie Mei,   STUDY:  CT HIP RIGHT WO IV CONTRAST;  2/3/2025 11:22 am      INDICATION:  Signs/Symptoms:R hip pain, fall.      COMPARISON:  Same day radiographs.      ACCESSION NUMBER(S):  [id number]      ORDERING CLINICIAN:  FABIO BASHIR      TECHNIQUE:  CT imaging of the right hip was obtained without administration of  intravenous contrast medium. Coronal and sagittal reformatted images  were performed.      FINDINGS:  OSSEOUS STRUCTURES:  There is a minimal minimally displaced fracture of the anterior  acetabulum extending into the base of the superior pubic ramus. No  additional fracture is identified. No dislocation. There is mild  femoroacetabular joint space narrowing posteriorly. No significant  hip joint effusion is visualized.      SOFT TISSUES:  No subcutaneous or intramuscular hematoma. A small fat containing  right inguinal hernia is noted. No acute abnormality is present in  the imaged pelvis.      Impression: Minimally displaced fracture anterior acetabulum extending into base  of superior pubic ramus.      MACRO:  None      Signed by: Jolie Mei 2/3/2025 11:33 AM  Dictation workstation:   [id number]  XR shoulder right 2+ views, XR humerus right, XR clavicle right  Narrative: Interpreted By:  Arden Michael,   STUDY:  XR CLAVICLE RIGHT; XR HUMERUS RIGHT; XR  SHOULDER RIGHT 2+ VIEWS;  2/3/2025 9:57 am      INDICATION:  Signs/Symptoms:pain, fall; Signs/Symptoms:R arm pain fall;  Signs/Symptoms:R shoulder pain.      COMPARISON:  None.      ACCESSION NUMBER(S):  [id number]; [id number]; [id number]      ORDERING CLINICIAN:  FABIO BASHIR      FINDINGS:  Bony structures:  Slightly impacted fracture at the humeral neck, and  suspected nondisplaced fracture of the greater tuberosity. The  remaining bony structures appear intact.      Joint spaces:  Maintained      Soft tissues:  Unremarkable without significant edema or radiodense  foreign body      Other:  None significant      Impression: Humeral fracture.      MACRO:  None      Signed by: Arden Michael 2/3/2025 10:15 AM  Dictation workstation:   [id number]  XR chest 1 view  Narrative: Interpreted By:  Arden Michael,   STUDY:  XR CHEST 1 VIEW;  2/3/2025 9:57 am      INDICATION:  Signs/Symptoms:fall.      COMPARISON:  None.      ACCESSION NUMBER(S):  [id number]      ORDERING CLINICIAN:  FABIO BASHIR      FINDINGS:  CARDIOMEDIASTINAL SILHOUETTE AND VASCULATURE:      Cardiac size:  Within normal limits.  Aortic shadow:  Within normal limits.      Mediastinal contours: Within normal limits.      Pulmonary vasculature:  The central vasculature is unremarkable      LUNGS:  Several linear opacities at the left lower lung medially may be due  to atelectasis and/or fibrosis. The lungs otherwise are clear.      ABDOMEN AND OTHER FINDINGS:  No remarkable upper abdominal findings.      BONES:  Slightly impacted fracture of the right humeral neck is noted.      Impression: 1.  Left lower lung atelectasis or fibrosis.  2. Right humeral fracture.      Signed by: Arden Michael 2/3/2025 10:12 AM  Dictation workstation:   [id number]  XR hip right with pelvis when performed 2 or 3 views  Narrative: Interpreted By:  Arden Michael,   STUDY:  XR HIP RIGHT WITH PELVIS WHEN PERFORMED 2 OR 3 VIEWS;  2/3/2025 9:57  am       INDICATION:  Signs/Symptoms:R hip pain, fall.      COMPARISON:  None.      ACCESSION NUMBER(S):  [id number]      ORDERING CLINICIAN:  FABIO BASHIR      FINDINGS:  Bony structures:  Intact      Joint spaces:  Maintained      Soft tissues:  Unremarkable without significant edema or radiodense  foreign body      Other:  None significant      Impression: No acute findings.      MACRO:  None      Signed by: Arden Michael 2/3/2025 10:11 AM  Dictation workstation:   [id number]  CT head wo IV contrast, CT cervical spine wo IV contrast  Narrative: Interpreted By:  Israel Cruz,   STUDY:  CT HEAD WO IV CONTRAST; CT CERVICAL SPINE WO IV CONTRAST;  2/3/2025  9:28 am      INDICATION:  Signs/Symptoms:fall head injury; Signs/Symptoms:fall.          COMPARISON:  None      ACCESSION NUMBER(S):  [id number]; [id number]      ORDERING CLINICIAN:  FABIO BASHIR      TECHNIQUE:  CT of the brain from the skull vertex to the skull base, without  intravenous contrast      CT cervical spine from the craniocervical junction through the  cervicothoracic junction without IV contrast, including sagittal and  coronal reformatted images      FINDINGS:  CT BRAIN      TRAUMA-RELATED      Brain Injury (BIG) guidelines CT values:      Skull fracture: No  SDH (subdural hematoma): None detected  EDH (epidural hematoma): None detected  IPH (intraparenchymal hemorrhage): None detected  SAH (subarachnoid hemorrhage): None detected  IVH (intraventricular hemorrhage): None detected      Reference:  Jono RICHARDS, Pérez CARRILLO, Sea MARSH, et al. The BIG (brain injury  guidelines) project: defining the management of traumatic brain  injury by acute care surgeons. J Trauma Acute Care Surg.  2014;76:912j881.      OTHER      ACUTE INTRACRANIAL MASS EFFECT:  Negative      CT EVIDENCE OF ACUTE / SUBACUTE TERRITORIAL ISCHEMIA:  Negative      VENTRICLES:  Normal caliber and configuration      OTHER BRAIN FINDINGS:  No additional findings to note      INCLUDED  PARANASAL SINUSES: All clear      INCLUDED MASTOID AIR CELLS: All clear      SKULL:  No lytic or blastic lesion      EXTRACRANIAL SOFT TISSUES:  Scalp and occular globes grossly normal  by CT      -------      CT CERVICAL SPINE      COUNTING REFERENCE: Craniocervical junction      CRANIOCERVICAL JUNCTION:  Intact      CERVICAL ALIGNMENT:  Rightward curvature is probably positional. No  jumped facets are other acute traumatic alignment abnormality      ACUTE FRACTURE: Negative      AGGRESSIVE OSSEOUS LESION: Negative      BONY CANAL AND FORAMINA:  Disc osteophyte complexes at C3-4 and C5-6  contribute to mild and moderate bony foraminal and bony canal stenoses      PARASPINAL SOFT TISSUES:  No large acute hematoma or other acute  posttraumatic finding      OTHER INCLUDED STRUCTURES:  No acute or contributory soft tissue  abnormality in the other cervical and upper thoracic soft tissues      Impression: NO ACUTE INTRACRANIAL PROCESS. SKULL INTACT      NO ACUTE FRACTURE OR SUBLUXATION IN THE CERVICAL SPINE      THIS REPORT SERVES AS THE DIAGNOSTIC INTERPRETATION FOR TWO EXAMS  PERFORMED CONCURRENTLY: CT BRAIN WITHOUT IV CONTRAST AND CT CERVICAL  SPINE WITHOUT IV CONTRAST      MACRO:  None      Signed by: Israel Cruz 2/3/2025 9:46 AM  Dictation workstation:   [id number]  ECG 12 lead  Normal sinus rhythm  Nonspecific ST abnormality  Abnormal ECG  When compared with ECG of 03-FEB-2025 09:04, (unconfirmed)  No significant change was found

## 2025-02-05 ENCOUNTER — APPOINTMENT (OUTPATIENT)
Dept: RADIOLOGY | Facility: HOSPITAL | Age: 75
End: 2025-02-05
Payer: MEDICARE

## 2025-02-05 LAB
ANION GAP SERPL CALC-SCNC: 9 MMOL/L (ref 10–20)
BASOPHILS # BLD AUTO: 0.03 X10*3/UL (ref 0–0.1)
BASOPHILS NFR BLD AUTO: 0.5 %
BUN SERPL-MCNC: 16 MG/DL (ref 6–23)
CALCIUM SERPL-MCNC: 8.7 MG/DL (ref 8.6–10.3)
CHLORIDE SERPL-SCNC: 104 MMOL/L (ref 98–107)
CO2 SERPL-SCNC: 30 MMOL/L (ref 21–32)
CREAT SERPL-MCNC: 1.07 MG/DL (ref 0.5–1.05)
EGFRCR SERPLBLD CKD-EPI 2021: 55 ML/MIN/1.73M*2
EOSINOPHIL # BLD AUTO: 0.14 X10*3/UL (ref 0–0.4)
EOSINOPHIL NFR BLD AUTO: 2.3 %
ERYTHROCYTE [DISTWIDTH] IN BLOOD BY AUTOMATED COUNT: 12.9 % (ref 11.5–14.5)
GLUCOSE SERPL-MCNC: 94 MG/DL (ref 74–99)
HCT VFR BLD AUTO: 35 % (ref 36–46)
HGB BLD-MCNC: 11.4 G/DL (ref 12–16)
HOLD SPECIMEN: NORMAL
IMM GRANULOCYTES # BLD AUTO: 0.02 X10*3/UL (ref 0–0.5)
IMM GRANULOCYTES NFR BLD AUTO: 0.3 % (ref 0–0.9)
LYMPHOCYTES # BLD AUTO: 1.57 X10*3/UL (ref 0.8–3)
LYMPHOCYTES NFR BLD AUTO: 25.7 %
MAGNESIUM SERPL-MCNC: 2.13 MG/DL (ref 1.6–2.4)
MCH RBC QN AUTO: 28.8 PG (ref 26–34)
MCHC RBC AUTO-ENTMCNC: 32.6 G/DL (ref 32–36)
MCV RBC AUTO: 88 FL (ref 80–100)
MONOCYTES # BLD AUTO: 0.54 X10*3/UL (ref 0.05–0.8)
MONOCYTES NFR BLD AUTO: 8.8 %
NEUTROPHILS # BLD AUTO: 3.82 X10*3/UL (ref 1.6–5.5)
NEUTROPHILS NFR BLD AUTO: 62.4 %
NRBC BLD-RTO: 0 /100 WBCS (ref 0–0)
PLATELET # BLD AUTO: 181 X10*3/UL (ref 150–450)
POTASSIUM SERPL-SCNC: 4.2 MMOL/L (ref 3.5–5.3)
RBC # BLD AUTO: 3.96 X10*6/UL (ref 4–5.2)
SODIUM SERPL-SCNC: 139 MMOL/L (ref 136–145)
WBC # BLD AUTO: 6.1 X10*3/UL (ref 4.4–11.3)

## 2025-02-05 PROCEDURE — 2500000005 HC RX 250 GENERAL PHARMACY W/O HCPCS: Performed by: STUDENT IN AN ORGANIZED HEALTH CARE EDUCATION/TRAINING PROGRAM

## 2025-02-05 PROCEDURE — 73110 X-RAY EXAM OF WRIST: CPT | Mod: RT

## 2025-02-05 PROCEDURE — 2500000004 HC RX 250 GENERAL PHARMACY W/ HCPCS (ALT 636 FOR OP/ED): Performed by: INTERNAL MEDICINE

## 2025-02-05 PROCEDURE — 1210000001 HC SEMI-PRIVATE ROOM DAILY

## 2025-02-05 PROCEDURE — 2500000004 HC RX 250 GENERAL PHARMACY W/ HCPCS (ALT 636 FOR OP/ED): Performed by: STUDENT IN AN ORGANIZED HEALTH CARE EDUCATION/TRAINING PROGRAM

## 2025-02-05 PROCEDURE — 2500000001 HC RX 250 WO HCPCS SELF ADMINISTERED DRUGS (ALT 637 FOR MEDICARE OP): Performed by: STUDENT IN AN ORGANIZED HEALTH CARE EDUCATION/TRAINING PROGRAM

## 2025-02-05 PROCEDURE — 97530 THERAPEUTIC ACTIVITIES: CPT | Mod: GP,CQ

## 2025-02-05 PROCEDURE — 83735 ASSAY OF MAGNESIUM: CPT | Performed by: STUDENT IN AN ORGANIZED HEALTH CARE EDUCATION/TRAINING PROGRAM

## 2025-02-05 PROCEDURE — 36415 COLL VENOUS BLD VENIPUNCTURE: CPT | Performed by: STUDENT IN AN ORGANIZED HEALTH CARE EDUCATION/TRAINING PROGRAM

## 2025-02-05 PROCEDURE — 2500000002 HC RX 250 W HCPCS SELF ADMINISTERED DRUGS (ALT 637 FOR MEDICARE OP, ALT 636 FOR OP/ED): Performed by: STUDENT IN AN ORGANIZED HEALTH CARE EDUCATION/TRAINING PROGRAM

## 2025-02-05 PROCEDURE — 2500000001 HC RX 250 WO HCPCS SELF ADMINISTERED DRUGS (ALT 637 FOR MEDICARE OP): Performed by: INTERNAL MEDICINE

## 2025-02-05 PROCEDURE — 73110 X-RAY EXAM OF WRIST: CPT | Mod: RIGHT SIDE | Performed by: RADIOLOGY

## 2025-02-05 PROCEDURE — 82374 ASSAY BLOOD CARBON DIOXIDE: CPT | Performed by: STUDENT IN AN ORGANIZED HEALTH CARE EDUCATION/TRAINING PROGRAM

## 2025-02-05 PROCEDURE — 97535 SELF CARE MNGMENT TRAINING: CPT | Mod: GO,CO

## 2025-02-05 PROCEDURE — 99232 SBSQ HOSP IP/OBS MODERATE 35: CPT | Performed by: INTERNAL MEDICINE

## 2025-02-05 PROCEDURE — 85025 COMPLETE CBC W/AUTO DIFF WBC: CPT | Performed by: STUDENT IN AN ORGANIZED HEALTH CARE EDUCATION/TRAINING PROGRAM

## 2025-02-05 RX ORDER — MORPHINE SULFATE 2 MG/ML
2 INJECTION, SOLUTION INTRAMUSCULAR; INTRAVENOUS EVERY 4 HOURS PRN
Status: DISCONTINUED | OUTPATIENT
Start: 2025-02-05 | End: 2025-02-07 | Stop reason: HOSPADM

## 2025-02-05 RX ORDER — OXYCODONE HYDROCHLORIDE 5 MG/1
5 TABLET ORAL EVERY 6 HOURS PRN
Status: DISCONTINUED | OUTPATIENT
Start: 2025-02-05 | End: 2025-02-06

## 2025-02-05 RX ORDER — AMOXICILLIN 250 MG
1 CAPSULE ORAL 2 TIMES DAILY
Status: DISCONTINUED | OUTPATIENT
Start: 2025-02-05 | End: 2025-02-07 | Stop reason: HOSPADM

## 2025-02-05 RX ADMIN — OXYCODONE HYDROCHLORIDE 5 MG: 5 TABLET ORAL at 08:49

## 2025-02-05 RX ADMIN — OXYCODONE HYDROCHLORIDE 5 MG: 5 TABLET ORAL at 00:51

## 2025-02-05 RX ADMIN — OXYCODONE HYDROCHLORIDE 5 MG: 5 TABLET ORAL at 15:54

## 2025-02-05 RX ADMIN — NIFEDIPINE 30 MG: 30 TABLET, FILM COATED, EXTENDED RELEASE ORAL at 06:14

## 2025-02-05 RX ADMIN — ENOXAPARIN SODIUM 40 MG: 40 INJECTION SUBCUTANEOUS at 15:55

## 2025-02-05 RX ADMIN — EZETIMIBE 10 MG: 10 TABLET ORAL at 06:14

## 2025-02-05 RX ADMIN — DOCUSATE SODIUM 50MG AND SENNOSIDES 8.6MG 1 TABLET: 8.6; 5 TABLET, FILM COATED ORAL at 14:41

## 2025-02-05 RX ADMIN — LIDOCAINE 4% 1 PATCH: 40 PATCH TOPICAL at 08:44

## 2025-02-05 RX ADMIN — LOSARTAN POTASSIUM 50 MG: 50 TABLET, FILM COATED ORAL at 06:14

## 2025-02-05 RX ADMIN — MORPHINE SULFATE 2 MG: 2 INJECTION, SOLUTION INTRAMUSCULAR; INTRAVENOUS at 20:57

## 2025-02-05 RX ADMIN — ESCITALOPRAM OXALATE 30 MG: 10 TABLET, FILM COATED ORAL at 06:14

## 2025-02-05 RX ADMIN — POLYETHYLENE GLYCOL 3350 17 G: 17 POWDER, FOR SOLUTION ORAL at 08:55

## 2025-02-05 RX ADMIN — HYDROXYZINE HYDROCHLORIDE 50 MG: 25 TABLET ORAL at 18:15

## 2025-02-05 RX ADMIN — MORPHINE SULFATE 2 MG: 2 INJECTION, SOLUTION INTRAMUSCULAR; INTRAVENOUS at 10:34

## 2025-02-05 RX ADMIN — FAMOTIDINE 40 MG: 20 TABLET, FILM COATED ORAL at 15:54

## 2025-02-05 ASSESSMENT — PAIN SCALES - GENERAL
PAINLEVEL_OUTOF10: 8
PAINLEVEL_OUTOF10: 8
PAINLEVEL_OUTOF10: 5 - MODERATE PAIN
PAINLEVEL_OUTOF10: 1
PAINLEVEL_OUTOF10: 8
PAINLEVEL_OUTOF10: 8
PAINLEVEL_OUTOF10: 6
PAINLEVEL_OUTOF10: 3
PAINLEVEL_OUTOF10: 3
PAINLEVEL_OUTOF10: 5 - MODERATE PAIN

## 2025-02-05 ASSESSMENT — COGNITIVE AND FUNCTIONAL STATUS - GENERAL
DRESSING REGULAR LOWER BODY CLOTHING: A LOT
STANDING UP FROM CHAIR USING ARMS: TOTAL
MOBILITY SCORE: 12
WALKING IN HOSPITAL ROOM: A LOT
CLIMB 3 TO 5 STEPS WITH RAILING: TOTAL
MOVING FROM LYING ON BACK TO SITTING ON SIDE OF FLAT BED WITH BEDRAILS: A LOT
DAILY ACTIVITIY SCORE: 13
TURNING FROM BACK TO SIDE WHILE IN FLAT BAD: A LOT
PERSONAL GROOMING: A LITTLE
MOVING TO AND FROM BED TO CHAIR: A LOT
DAILY ACTIVITIY SCORE: 14
MOVING FROM LYING ON BACK TO SITTING ON SIDE OF FLAT BED WITH BEDRAILS: A LOT
CLIMB 3 TO 5 STEPS WITH RAILING: TOTAL
WALKING IN HOSPITAL ROOM: TOTAL
DRESSING REGULAR UPPER BODY CLOTHING: A LOT
PERSONAL GROOMING: A LITTLE
DRESSING REGULAR LOWER BODY CLOTHING: TOTAL
TURNING FROM BACK TO SIDE WHILE IN FLAT BAD: TOTAL
HELP NEEDED FOR BATHING: A LOT
EATING MEALS: A LITTLE
STANDING UP FROM CHAIR USING ARMS: A LOT
WALKING IN HOSPITAL ROOM: TOTAL
EATING MEALS: A LITTLE
MOVING TO AND FROM BED TO CHAIR: TOTAL
DRESSING REGULAR UPPER BODY CLOTHING: A LOT
MOVING FROM LYING ON BACK TO SITTING ON SIDE OF FLAT BED WITH BEDRAILS: A LITTLE
TURNING FROM BACK TO SIDE WHILE IN FLAT BAD: TOTAL
HELP NEEDED FOR BATHING: A LOT
MOVING TO AND FROM BED TO CHAIR: TOTAL
TOILETING: A LOT
STANDING UP FROM CHAIR USING ARMS: TOTAL
MOBILITY SCORE: 7
TOILETING: A LOT
MOBILITY SCORE: 7
CLIMB 3 TO 5 STEPS WITH RAILING: TOTAL

## 2025-02-05 ASSESSMENT — ACTIVITIES OF DAILY LIVING (ADL): HOME_MANAGEMENT_TIME_ENTRY: 15

## 2025-02-05 ASSESSMENT — PAIN SCALES - WONG BAKER
WONGBAKER_NUMERICALRESPONSE: NO HURT
WONGBAKER_NUMERICALRESPONSE: HURTS LITTLE MORE

## 2025-02-05 ASSESSMENT — PAIN - FUNCTIONAL ASSESSMENT
PAIN_FUNCTIONAL_ASSESSMENT: 0-10

## 2025-02-05 ASSESSMENT — PAIN DESCRIPTION - LOCATION
LOCATION: SHOULDER

## 2025-02-05 ASSESSMENT — PAIN DESCRIPTION - DESCRIPTORS
DESCRIPTORS: ACHING
DESCRIPTORS: ACHING;DISCOMFORT

## 2025-02-05 ASSESSMENT — PAIN DESCRIPTION - ORIENTATION
ORIENTATION: RIGHT

## 2025-02-05 ASSESSMENT — PAIN SCALES - PAIN ASSESSMENT IN ADVANCED DEMENTIA (PAINAD): TOTALSCORE: MEDICATION (SEE MAR);EMOTIONAL SUPPORT

## 2025-02-05 NOTE — PROGRESS NOTES
Occupational Therapy    OT Treatment    Patient Name: Mana Avila  MRN: [id number]  Department: [facility]  Room: [facility]  Today's Date: 2/5/2025  Time Calculation  Start Time: 1029  Stop Time: 1059  Time Calculation (min): 30 min        Assessment:  OT Assessment: Pt. presents with weakness, decreased trunk control and balance, and requires Max A to total assist for all ADLs and transfers. Pt. would benefit from OT to address deficits  Evaluation/Treatment Tolerance: Patient limited by pain  Medical Staff Made Aware: Yes  End of Session Communication: Bedside nurse  End of Session Patient Position: Bed, 3 rail up (bed in chair position, pt verbalized (+) comfort.  R UE positioned with pillows for comfort, sling on. All needs met, call light within reach.)  Evaluation/Treatment Tolerance: Patient limited by pain  Medical Staff Made Aware: Yes  Plan:  Treatment Interventions: ADL retraining, Functional transfer training, Endurance training, Patient/family training  OT Frequency: 2 times per week  OT Discharge Recommendations: Moderate intensity level of continued care  OT Recommended Transfer Status: Maximum assist, Assist of 2  OT - OK to Discharge: Yes  Treatment Interventions: ADL retraining, Functional transfer training, Endurance training, Patient/family training    Subjective   Previous Visit Info:  OT Last Visit  OT Received On: 02/05/25  General:  General  Past Medical History Relevant to Rehab: HTN, Arthritis, Appey  Family/Caregiver Present:  (Family present upon arrival, however left room at start of therapy.  Pt's dtr intermittently watching tx session)  Co-Treatment: PT  Co-Treatment Reason: Pt seen with PT to maximize patient’s function and safety d/t pt’s significant debilitation.  Prior to Session Communication: Bedside nurse  Patient Position Received: Bed, 3 rail up, Alarm on  General Comment: Pt agreeable to OT, limited by increased pain with movement. R wrist XR completed after tx: (-) acute  findings  Precautions:  UE Weight Bearing Status:  (R UE NWB-simple sling AAT except hygiene/dressing)  LE Weight Bearing Status: Weight Bearing as Tolerated (RLE)  Medical Precautions: Fall precautions  Precautions Comment: Pt maintains NWB, does not attempt to move R UE      Pain:  Pain Assessment  Pain Assessment: 0-10  0-10 (Numeric) Pain Score: 8  Pain Type: Acute pain  Pain Location:  (R shoulder, R groin and upper right thigh)  Pain Interventions: Medication (See MAR), Repositioned, Relaxation technique  Response to Interventions:  (Pt's pain improved to 6/10 at end of tx after movement and IV pain medication)    Objective    Cognition:  Cognition  Overall Cognitive Status: Within Functional Limits  Coordination:     Activities of Daily Living: Grooming  Grooming Comments: Pt performed grooming tasks with min assist while seated at EOB using LUE with min to mod assist to maintain balance    UE Dressing  UE Dressing Comments: Pt very guarded with movement, did not address UB adl's d/t significant pain.  Pt educated verbally on NWB restrictions and compenstatory techniques to complete bathing/dressing (Simple sling readjusted for fit and comfort.)       Bed Mobility/Transfers: Bed Mobility  Bed Mobility:  (supine<> sit transfer with total assist x 2.  Heavy use of pads to facilitate rolling and elevation of trunk.  Pads also used to scoot pt foward once at EOB.)    Transfers  Transfer:  (NT d/t increase pain with all movement and lethargy d/t IV pain medication)    Sitting Balance:  Static Sitting Balance  Static Sitting-Comment/Number of Minutes: fair-  Dynamic Sitting Balance  Dynamic Sitting-Comments: fair-/poor+  Standing Balance:  Static Standing Balance  Static Standing-Comment/Number of Minutes: NT d/t increase pain with all movement and lethargy d/t IV pain medication       Therapy/Activity: Therapeutic Exercise  Therapeutic Exercise Performed:  (Pt resistive to movement of R elbow and wrist.  Pt  "performed A/AAROM R digits x10)    Balance/Neuromuscular Re-Education  Balance/Neuromuscular Re-Education Activity Performed:  (Pt performed seated reaching activities at EOB, Min to Mod assist with weight shifting and reaching 1-2\" outside of DIANDRA using L UE.)      Outcome Measures:[facility] Daily Activity  Putting on and taking off regular lower body clothing: Total  Bathing (including washing, rinsing, drying): A lot  Putting on and taking off regular upper body clothing: A lot  Toileting, which includes using toilet, bedpan or urinal: A lot  Taking care of personal grooming such as brushing teeth: A little  Eating Meals: A little  Daily Activity - Total Score: 13    EDUCATION:  Education  Individual(s) Educated: Patient (Pt's dtr)  Education Provided: Diagnosis & Precautions, Edema control, Symptom management, Fall precautons, POC discussed and agreed upon (body mechanics with bed mobility, positioning of R UE)    Goals:  Encounter Problems       Encounter Problems (Active)       OT Goals       pt will complete R elbow, wrist and hand ROM indep (Progressing)       Start:  02/04/25    Expected End:  02/18/25            Pt will bathe/dress UB with min A (Progressing)       Start:  02/04/25    Expected End:  02/18/25            Pt will dress LB with min A (Progressing)       Start:  02/04/25    Expected End:  02/18/25            Pt will transfer to bed, chair ,toilet with min A (Progressing)       Start:  02/04/25    Expected End:  02/18/25                             "

## 2025-02-05 NOTE — CARE PLAN
The patient's goals for the shift include      The clinical goals for the shift include pain tolerable throughout shift

## 2025-02-05 NOTE — CARE PLAN
Problem: Skin  Goal: Prevent/minimize sheer/friction injuries  2/5/2025 1434 by Heather Lawrence RN  Flowsheets (Taken 2/5/2025 1434)  Prevent/minimize sheer/friction injuries: Use pull sheet  2/5/2025 1434 by Heather Lawrence RN  Outcome: Progressing  Flowsheets (Taken 2/5/2025 1434)  Prevent/minimize sheer/friction injuries: Use pull sheet   The patient's goals for the shift include      The clinical goals for the shift include pain tolerable throughout shift

## 2025-02-05 NOTE — CARE PLAN
The patient's goals for the shift include  rest    The clinical goals for the shift include pain management      Problem: Pain  Goal: Takes deep breaths with improved pain control throughout the shift  Outcome: Progressing  Goal: Turns in bed with improved pain control throughout the shift  Outcome: Progressing  Goal: Walks with improved pain control throughout the shift  Outcome: Progressing  Goal: Performs ADL's with improved pain control throughout shift  Outcome: Progressing  Goal: Participates in PT with improved pain control throughout the shift  Outcome: Progressing  Goal: Free from opioid side effects throughout the shift  Outcome: Progressing  Goal: Free from acute confusion related to pain meds throughout the shift  Outcome: Progressing     Problem: Skin  Goal: Prevent/minimize sheer/friction injuries  Outcome: Progressing  Goal: Decreased wound size/increased tissue granulation at next dressing change  Outcome: Progressing  Goal: Participates in plan/prevention/treatment measures  Outcome: Progressing  Goal: Prevent/manage excess moisture  Outcome: Progressing  Goal: Promote/optimize nutrition  Outcome: Progressing  Goal: Promote skin healing  Outcome: Progressing

## 2025-02-05 NOTE — PROGRESS NOTES
Physical Therapy    Physical Therapy Treatment    Patient Name: Mana Avila  MRN: [id number]  Today's Date: 2/5/2025  Time Calculation  Start Time: 1030  Stop Time: 1100  Time Calculation (min): 30 min     902/902-A    Assessment/Plan   PT Assessment  PT Assessment Results: Decreased strength, Decreased endurance, Impaired balance, Decreased mobility, Decreased safety awareness  Rehab Prognosis: Fair  Barriers to Discharge Home: Physical needs  Treatment Tolerance: Patient limited by pain, Patient limited by fatigue  Medical Staff Made Aware: Yes  End of Session Communication: Bedside nurse, PCT/BUTCH/REBECCA  Assessment Comment: Patient continues to require (A) for safety with bed mobility/transfers. Patient will benefit from additional PT to address deficits and improve mobility.  End of Session Patient Position: Bed, 3 rail up, Alarm on (Bed in chair position; Pillows for support/comfort; Call light, phone, and tray table within reach.)  PT Plan  Inpatient/Swing Bed or Outpatient: Inpatient  Treatment/Interventions: Bed mobility, Transfer training, Gait training, Balance training, Strengthening, Therapeutic exercise, Therapeutic activity   PT Frequency: 4 times per week  PT Discharge Recommendations:  (Moderate intenstiy at moderate to high frequency in 24/7 setting.)  Equipment Recommended upon Discharge:  (TBD)   PT Recommended Transfer Status: Assist x2    General Visit Information:   PT  Visit  PT Received On: 02/05/25  General  Family/Caregiver Present: Yes (Family present, but stepped out of room per patient's request.)  Co-Treatment: OT  Co-Treatment Reason: Co-treat with OT to maximize patient and staff safety with bed mobility/transfers.  Prior to Session Communication: Bedside nurse, PCT/BUTCH/REBECCA  Patient Position Received: Bed, 3 rail up, Alarm on  General Comment: Agreeable to therapy, but apprehensive about all activities/movement.    General Observations:   General Observation: No tele; Alarm; Sling (R)UE;  Patient apprehensive about all movements and anticipating pain before completing tasks.    Subjective     Precautions:  Precautions  UE Weight Bearing Status: Right Non-Weight Bearing (Sling to be worn at all times with exception of skin care/hygiene.)  LE Weight Bearing Status: Weight Bearing as Tolerated ((R)LE)  Medical Precautions: Fall precautions  Prosthesis/Orthosis Used:  (Sling to be worn at all times except hygiene)    Objective     Pain:  Pain Assessment  Pain Assessment:  (8/10 (R)UE and (R)pelvis pain at the start of session. Nursing gave patient IV morphine. 6/10 (R)UE and (R)pelvis pain at end of session.)    Cognition:  Cognition  Overall Cognitive Status: Within Functional Limits    Balance:   Static Sitting Balance  Static Sitting-Comment/Number of Minutes: F-  Dynamic Sitting Balance  Dynamic Sitting-Comments: P+    Treatments:  Therapeutic Exercise  Therapeutic Exercise Performed: Yes  Therapeutic Exercise Activity 1: Instructed patient in seated ther ex. AROM BLE with AP/LAQ x10. Progressively increasing (R)LE with LAQ, but occasionaly having muscle spasms.     Balance/Neuromuscular Re-Education  Balance/Neuromuscular Re-Education Activity Performed: Yes  Balance/Neuromuscular Re-Education Activity 1: Tolerated EOB sitting ~20min. Multi-level dynamic reaching in seated position with (L)UE(L/R/C/Above head/Below waist/Across midline). Min/Mod(A) to maintain balance 2° posterior lean with unsupported tasks.  Bed Mobility  Bed Mobility: Yes  Bed Mobility 1  Bed Mobility 1: Supine to sitting, Sitting to supine  Level of Assistance 1: Dependent, +2  Bed Mobility Comments 1: HOB ~40° supine to sit and HOB flat sit to supine. Hand over hand (A) to reach (L)UE across body to use the bed rail for support. (A) to lift UB from HOB while moving LEs over the EOB. (A) to support UB while lifting LEs onto the bed. v/c for breathing throughout transitional movements to prevent her from holding her  breath/tensing up with movements.                   Outcome Measures:     [facility] Basic Mobility  Turning from your back to your side while in a flat bed without using bedrails: A lot  Moving from lying on your back to sitting on the side of a flat bed without using bedrails: Total  Moving to and from bed to chair (including a wheelchair): Total  Standing up from a chair using your arms (e.g. wheelchair or bedside chair): Total  To walk in hospital room: Total  Climbing 3-5 steps with railing: Total  Basic Mobility - Total Score: 7                                      Education Documentation  Precautions, taught by Pat Nice PTA at 2/5/2025  2:56 PM.  Learner: Family, Patient  Readiness: Acceptance  Method: Explanation, Demonstration, Teach-back  Response: Verbalizes Understanding, Needs Reinforcement  Comment: See therapy note.    Mobility Training, taught by Pat Nice PTA at 2/5/2025  2:56 PM.  Learner: Family, Patient  Readiness: Acceptance  Method: Explanation, Demonstration, Teach-back  Response: Verbalizes Understanding, Needs Reinforcement  Comment: See therapy note.           EDUCATION:  Individual(s) Educated: Patient, Child  Education Provided: Body Mechanics, Fall Risk, Home Exercise Program, POC, Posture (Balance; Safety with bed mobility/transfers.)  Patient Response to Education: Patient/Caregiver Verbalized Understanding of Information, Patient/Caregiver Performed Return Demonstration of Exercises/Activities, Patient/Caregiver Asked Appropriate Questions    Encounter Problems       Encounter Problems (Active)       PT Problem       Bed mobility supine <> sit + 1 moderate assist  (Progressing)       Start:  02/04/25    Expected End:  02/18/25            Transfers sit <> stand /bed <> chair with QC v pyramid walker + 1 moderate assist (Not Progressing)       Start:  02/04/25    Expected End:  02/18/25            Patient to ambulate 10' with QC v pyramid walker + 1 moderate assist  (Not  Progressing)       Start:  02/04/25    Expected End:  02/18/25

## 2025-02-05 NOTE — PROGRESS NOTES
ASSESSMENT & PLAN:       Mechanical fall  Right humerus fracture  Right acetabular to superior pubic ramus fracture  - Presented from home after slipping on ice, landing on her abdomen/chest without head trauma or loss of consciousness  - Found to have right humerus and right acetabular fracture  - Orthopedic surgery consulted; Non-operative management advised. NWB to RUE w/ sling worn at all times & no ROM of shoulder. WBAT to lower extremities. 2-3 week outpt follow-up advised   - PT/OT eval done, plan for snf  - Pain management as ordered, will not make any changes today. On bowel regimen.     R wrist pain  - will get XR to r/o fracture here as well     Essential hypertension  Hypertensive urgency  - Elevated blood pressures in setting of pain from fall on presentation  - Home medications were resumed  - BP control appears improved w/ resumption and pain control    VTE Prophylaxis: Lovenox SubQ    Disposition: snf    MD DARWIN Quiles. Has flares of pain with activity, such as with therapy. However pt and daughters both note more sleepy with the opioids.       OBJECTIVE:       Last Recorded Vitals:  Vitals:    02/04/25 1528 02/04/25 1700 02/05/25 0034 02/05/25 0811   BP: 129/61 124/63 162/70 141/65   BP Location:    Left arm   Patient Position: Lying Lying  Sitting   Pulse: 59 62 69 73   Resp: 12 16 16 16   Temp:  36.5 °C (97.7 °F) 36.6 °C (97.9 °F) 37 °C (98.6 °F)   TempSrc:  Temporal  Temporal   SpO2: (!) 85% 92% 92% 93%   Weight:       Height:           Last I/O:  I/O last 3 completed shifts:  In: 100 (1.5 mL/kg) [P.O.:100]  Out: - (0 mL/kg)   Weight: 65.7 kg     Physical Exam:  General: Well-developed elderly female in mild distress  HEENT: Clear sclera, EOMI, trachea midline, moist mucous membranes  Abdomen: Soft, nontender, nondistended  Extremities: RUE in sling  Neurological:no dysarthria, cranial nerves grossly intact, awake, alert  Psychiatric: Appropriate mood and affect  Skin:  Warm, dry    Inpatient Medications:  enoxaparin, 40 mg, subcutaneous, q24h  escitalopram, 30 mg, oral, Daily  ezetimibe, 10 mg, oral, Daily  famotidine, 40 mg, oral, Nightly  lidocaine, 1 patch, transdermal, Daily  losartan, 50 mg, oral, Daily  NIFEdipine ER, 30 mg, oral, Daily        PRN Medications  PRN medications: acetaminophen **OR** acetaminophen **OR** acetaminophen, hydrOXYzine HCL, morphine, ondansetron, oxyCODONE, polyethylene glycol    Continuous Medications:         LABS AND IMAGING:     Labs:  Results for orders placed or performed during the hospital encounter of 02/03/25 (from the past 24 hours)   Basic Metabolic Panel   Result Value Ref Range    Glucose 94 74 - 99 mg/dL    Sodium 139 136 - 145 mmol/L    Potassium 4.2 3.5 - 5.3 mmol/L    Chloride 104 98 - 107 mmol/L    Bicarbonate 30 21 - 32 mmol/L    Anion Gap 9 (L) 10 - 20 mmol/L    Urea Nitrogen 16 6 - 23 mg/dL    Creatinine 1.07 (H) 0.50 - 1.05 mg/dL    eGFR 55 (L) >60 mL/min/1.73m*2    Calcium 8.7 8.6 - 10.3 mg/dL   CBC and Auto Differential   Result Value Ref Range    WBC 6.1 4.4 - 11.3 x10*3/uL    nRBC 0.0 0.0 - 0.0 /100 WBCs    RBC 3.96 (L) 4.00 - 5.20 x10*6/uL    Hemoglobin 11.4 (L) 12.0 - 16.0 g/dL    Hematocrit 35.0 (L) 36.0 - 46.0 %    MCV 88 80 - 100 fL    MCH 28.8 26.0 - 34.0 pg    MCHC 32.6 32.0 - 36.0 g/dL    RDW 12.9 11.5 - 14.5 %    Platelets 181 150 - 450 x10*3/uL    Neutrophils % 62.4 40.0 - 80.0 %    Immature Granulocytes %, Automated 0.3 0.0 - 0.9 %    Lymphocytes % 25.7 13.0 - 44.0 %    Monocytes % 8.8 2.0 - 10.0 %    Eosinophils % 2.3 0.0 - 6.0 %    Basophils % 0.5 0.0 - 2.0 %    Neutrophils Absolute 3.82 1.60 - 5.50 x10*3/uL    Immature Granulocytes Absolute, Automated 0.02 0.00 - 0.50 x10*3/uL    Lymphocytes Absolute 1.57 0.80 - 3.00 x10*3/uL    Monocytes Absolute 0.54 0.05 - 0.80 x10*3/uL    Eosinophils Absolute 0.14 0.00 - 0.40 x10*3/uL    Basophils Absolute 0.03 0.00 - 0.10 x10*3/uL   Magnesium   Result Value Ref Range     Magnesium 2.13 1.60 - 2.40 mg/dL   SST TOP   Result Value Ref Range    Extra Tube Hold for add-ons.         Imaging:  CT hip right wo IV contrast  Addendum: Interpreted By:  Jolie Mei,    ADDENDUM:   On further imaging review, there is a tiny crescentic shaped calcific   density along the inferior margin of the pubic symphysis at the   midline suggestive of an acute-subacute minimally displaced avulsion   fracture.        Signed by: Jolie Mei 2/3/2025 11:38 AM        -------- ORIGINAL REPORT --------   Dictation workstation:   [id number]  Narrative: Interpreted By:  Jolie Mei,   STUDY:  CT HIP RIGHT WO IV CONTRAST;  2/3/2025 11:22 am      INDICATION:  Signs/Symptoms:R hip pain, fall.      COMPARISON:  Same day radiographs.      ACCESSION NUMBER(S):  [id number]      ORDERING CLINICIAN:  FABIO BASHIR      TECHNIQUE:  CT imaging of the right hip was obtained without administration of  intravenous contrast medium. Coronal and sagittal reformatted images  were performed.      FINDINGS:  OSSEOUS STRUCTURES:  There is a minimal minimally displaced fracture of the anterior  acetabulum extending into the base of the superior pubic ramus. No  additional fracture is identified. No dislocation. There is mild  femoroacetabular joint space narrowing posteriorly. No significant  hip joint effusion is visualized.      SOFT TISSUES:  No subcutaneous or intramuscular hematoma. A small fat containing  right inguinal hernia is noted. No acute abnormality is present in  the imaged pelvis.      Impression: Minimally displaced fracture anterior acetabulum extending into base  of superior pubic ramus.      MACRO:  None      Signed by: Jolie Mei 2/3/2025 11:33 AM  Dictation workstation:   [id number]  XR shoulder right 2+ views, XR humerus right, XR clavicle right  Narrative: Interpreted By:  Arden Michael,   STUDY:  XR CLAVICLE RIGHT; XR HUMERUS RIGHT; XR SHOULDER RIGHT 2+ VIEWS;  2/3/2025 9:57 am       INDICATION:  Signs/Symptoms:pain, fall; Signs/Symptoms:R arm pain fall;  Signs/Symptoms:R shoulder pain.      COMPARISON:  None.      ACCESSION NUMBER(S):  [id number]; [id number]; [id number]      ORDERING CLINICIAN:  FABIO BASHIR      FINDINGS:  Bony structures:  Slightly impacted fracture at the humeral neck, and  suspected nondisplaced fracture of the greater tuberosity. The  remaining bony structures appear intact.      Joint spaces:  Maintained      Soft tissues:  Unremarkable without significant edema or radiodense  foreign body      Other:  None significant      Impression: Humeral fracture.      MACRO:  None      Signed by: Arden Michael 2/3/2025 10:15 AM  Dictation workstation:   [id number]  XR chest 1 view  Narrative: Interpreted By:  Arden Michael,   STUDY:  XR CHEST 1 VIEW;  2/3/2025 9:57 am      INDICATION:  Signs/Symptoms:fall.      COMPARISON:  None.      ACCESSION NUMBER(S):  [id number]      ORDERING CLINICIAN:  FABIO BASHIR      FINDINGS:  CARDIOMEDIASTINAL SILHOUETTE AND VASCULATURE:      Cardiac size:  Within normal limits.  Aortic shadow:  Within normal limits.      Mediastinal contours: Within normal limits.      Pulmonary vasculature:  The central vasculature is unremarkable      LUNGS:  Several linear opacities at the left lower lung medially may be due  to atelectasis and/or fibrosis. The lungs otherwise are clear.      ABDOMEN AND OTHER FINDINGS:  No remarkable upper abdominal findings.      BONES:  Slightly impacted fracture of the right humeral neck is noted.      Impression: 1.  Left lower lung atelectasis or fibrosis.  2. Right humeral fracture.      Signed by: Ardne Michael 2/3/2025 10:12 AM  Dictation workstation:   [id number]  XR hip right with pelvis when performed 2 or 3 views  Narrative: Interpreted By:  Arden Michael,   STUDY:  XR HIP RIGHT WITH PELVIS WHEN PERFORMED 2 OR 3 VIEWS;  2/3/2025 9:57  am      INDICATION:  Signs/Symptoms:R hip pain, fall.       COMPARISON:  None.      ACCESSION NUMBER(S):  [id number]      ORDERING CLINICIAN:  FABIO BASHIR      FINDINGS:  Bony structures:  Intact      Joint spaces:  Maintained      Soft tissues:  Unremarkable without significant edema or radiodense  foreign body      Other:  None significant      Impression: No acute findings.      MACRO:  None      Signed by: Arden Michael 2/3/2025 10:11 AM  Dictation workstation:   [id number]  CT head wo IV contrast, CT cervical spine wo IV contrast  Narrative: Interpreted By:  Israel Cruz,   STUDY:  CT HEAD WO IV CONTRAST; CT CERVICAL SPINE WO IV CONTRAST;  2/3/2025  9:28 am      INDICATION:  Signs/Symptoms:fall head injury; Signs/Symptoms:fall.          COMPARISON:  None      ACCESSION NUMBER(S):  [id number]; [id number]      ORDERING CLINICIAN:  FABIO BASHIR      TECHNIQUE:  CT of the brain from the skull vertex to the skull base, without  intravenous contrast      CT cervical spine from the craniocervical junction through the  cervicothoracic junction without IV contrast, including sagittal and  coronal reformatted images      FINDINGS:  CT BRAIN      TRAUMA-RELATED      Brain Injury (BIG) guidelines CT values:      Skull fracture: No  SDH (subdural hematoma): None detected  EDH (epidural hematoma): None detected  IPH (intraparenchymal hemorrhage): None detected  SAH (subarachnoid hemorrhage): None detected  IVH (intraventricular hemorrhage): None detected      Reference:  Jono RICHARDS, Pérez RS, Sea M, et al. The BIG (brain injury  guidelines) project: defining the management of traumatic brain  injury by acute care surgeons. J Trauma Acute Care Surg.  2014;76:298n599.      OTHER      ACUTE INTRACRANIAL MASS EFFECT:  Negative      CT EVIDENCE OF ACUTE / SUBACUTE TERRITORIAL ISCHEMIA:  Negative      VENTRICLES:  Normal caliber and configuration      OTHER BRAIN FINDINGS:  No additional findings to note      INCLUDED PARANASAL SINUSES: All clear      INCLUDED MASTOID AIR  CELLS: All clear      SKULL:  No lytic or blastic lesion      EXTRACRANIAL SOFT TISSUES:  Scalp and occular globes grossly normal  by CT      -------      CT CERVICAL SPINE      COUNTING REFERENCE: Craniocervical junction      CRANIOCERVICAL JUNCTION:  Intact      CERVICAL ALIGNMENT:  Rightward curvature is probably positional. No  jumped facets are other acute traumatic alignment abnormality      ACUTE FRACTURE: Negative      AGGRESSIVE OSSEOUS LESION: Negative      BONY CANAL AND FORAMINA:  Disc osteophyte complexes at C3-4 and C5-6  contribute to mild and moderate bony foraminal and bony canal stenoses      PARASPINAL SOFT TISSUES:  No large acute hematoma or other acute  posttraumatic finding      OTHER INCLUDED STRUCTURES:  No acute or contributory soft tissue  abnormality in the other cervical and upper thoracic soft tissues      Impression: NO ACUTE INTRACRANIAL PROCESS. SKULL INTACT      NO ACUTE FRACTURE OR SUBLUXATION IN THE CERVICAL SPINE      THIS REPORT SERVES AS THE DIAGNOSTIC INTERPRETATION FOR TWO EXAMS  PERFORMED CONCURRENTLY: CT BRAIN WITHOUT IV CONTRAST AND CT CERVICAL  SPINE WITHOUT IV CONTRAST      MACRO:  None      Signed by: Israel Cruz 2/3/2025 9:46 AM  Dictation workstation:   [id number]  ECG 12 lead  Normal sinus rhythm  Nonspecific ST abnormality  Abnormal ECG  When compared with ECG of 03-FEB-2025 09:04, (unconfirmed)  No significant change was found

## 2025-02-05 NOTE — PROGRESS NOTES
02/05/25 1446   Discharge Planning   Home or Post Acute Services Post acute facilities (Rehab/SNF/etc)   Type of Post Acute Facility Services Skilled nursing   Expected Discharge Disposition SNF  ([facility])   Does the patient need discharge transport arranged? Yes   RoundTrip coordination needed? Yes   Patient Choice   Provider Choice list and CMS website (https://medicare.gov/care-compare#search) for post-acute Quality and Resource Measure Data were provided and reviewed with: Family;Patient   Patient / Family choosing to utilize agency / facility established prior to hospitalization No   Stroke Family Assessment   Stroke Family Assessment Needed No   Intensity of Service   Intensity of Service 0-30 min     TCC spoke with pt's daughter who confirmed pt's SNF choice is [facility]. She also gave second choice [facility]. Referrals sent in [facility] and pending accept. Can dc on Fri 2/7 once meets 3 inpatient midnights and is medically cleared.

## 2025-02-06 PROCEDURE — 2500000002 HC RX 250 W HCPCS SELF ADMINISTERED DRUGS (ALT 637 FOR MEDICARE OP, ALT 636 FOR OP/ED): Performed by: STUDENT IN AN ORGANIZED HEALTH CARE EDUCATION/TRAINING PROGRAM

## 2025-02-06 PROCEDURE — 2500000004 HC RX 250 GENERAL PHARMACY W/ HCPCS (ALT 636 FOR OP/ED): Performed by: INTERNAL MEDICINE

## 2025-02-06 PROCEDURE — 2500000001 HC RX 250 WO HCPCS SELF ADMINISTERED DRUGS (ALT 637 FOR MEDICARE OP): Performed by: INTERNAL MEDICINE

## 2025-02-06 PROCEDURE — 2500000005 HC RX 250 GENERAL PHARMACY W/O HCPCS: Performed by: STUDENT IN AN ORGANIZED HEALTH CARE EDUCATION/TRAINING PROGRAM

## 2025-02-06 PROCEDURE — 97535 SELF CARE MNGMENT TRAINING: CPT | Mod: GO,CO

## 2025-02-06 PROCEDURE — 99232 SBSQ HOSP IP/OBS MODERATE 35: CPT | Performed by: INTERNAL MEDICINE

## 2025-02-06 PROCEDURE — 2500000004 HC RX 250 GENERAL PHARMACY W/ HCPCS (ALT 636 FOR OP/ED): Performed by: STUDENT IN AN ORGANIZED HEALTH CARE EDUCATION/TRAINING PROGRAM

## 2025-02-06 PROCEDURE — 2500000001 HC RX 250 WO HCPCS SELF ADMINISTERED DRUGS (ALT 637 FOR MEDICARE OP): Performed by: STUDENT IN AN ORGANIZED HEALTH CARE EDUCATION/TRAINING PROGRAM

## 2025-02-06 PROCEDURE — 1210000001 HC SEMI-PRIVATE ROOM DAILY

## 2025-02-06 PROCEDURE — 97530 THERAPEUTIC ACTIVITIES: CPT | Mod: GP,CQ

## 2025-02-06 RX ORDER — OXYCODONE HYDROCHLORIDE 5 MG/1
5 TABLET ORAL EVERY 4 HOURS PRN
Status: DISCONTINUED | OUTPATIENT
Start: 2025-02-06 | End: 2025-02-07

## 2025-02-06 RX ADMIN — MORPHINE SULFATE 2 MG: 2 INJECTION, SOLUTION INTRAMUSCULAR; INTRAVENOUS at 06:25

## 2025-02-06 RX ADMIN — OXYCODONE HYDROCHLORIDE 5 MG: 5 TABLET ORAL at 02:34

## 2025-02-06 RX ADMIN — OXYCODONE HYDROCHLORIDE 5 MG: 5 TABLET ORAL at 21:42

## 2025-02-06 RX ADMIN — ENOXAPARIN SODIUM 40 MG: 40 INJECTION SUBCUTANEOUS at 16:58

## 2025-02-06 RX ADMIN — LOSARTAN POTASSIUM 50 MG: 50 TABLET, FILM COATED ORAL at 06:26

## 2025-02-06 RX ADMIN — DOCUSATE SODIUM 50MG AND SENNOSIDES 8.6MG 1 TABLET: 8.6; 5 TABLET, FILM COATED ORAL at 08:28

## 2025-02-06 RX ADMIN — NIFEDIPINE 30 MG: 30 TABLET, FILM COATED, EXTENDED RELEASE ORAL at 06:26

## 2025-02-06 RX ADMIN — OXYCODONE HYDROCHLORIDE 5 MG: 5 TABLET ORAL at 13:25

## 2025-02-06 RX ADMIN — FAMOTIDINE 40 MG: 20 TABLET, FILM COATED ORAL at 16:58

## 2025-02-06 RX ADMIN — LIDOCAINE 4% 1 PATCH: 40 PATCH TOPICAL at 08:29

## 2025-02-06 RX ADMIN — DOCUSATE SODIUM 50MG AND SENNOSIDES 8.6MG 1 TABLET: 8.6; 5 TABLET, FILM COATED ORAL at 20:30

## 2025-02-06 RX ADMIN — OXYCODONE HYDROCHLORIDE 5 MG: 5 TABLET ORAL at 08:28

## 2025-02-06 RX ADMIN — EZETIMIBE 10 MG: 10 TABLET ORAL at 06:25

## 2025-02-06 RX ADMIN — ESCITALOPRAM OXALATE 30 MG: 10 TABLET, FILM COATED ORAL at 06:26

## 2025-02-06 ASSESSMENT — COGNITIVE AND FUNCTIONAL STATUS - GENERAL
TURNING FROM BACK TO SIDE WHILE IN FLAT BAD: A LOT
MOVING FROM LYING ON BACK TO SITTING ON SIDE OF FLAT BED WITH BEDRAILS: A LOT
DRESSING REGULAR LOWER BODY CLOTHING: A LOT
HELP NEEDED FOR BATHING: A LOT
PERSONAL GROOMING: A LITTLE
MOBILITY SCORE: 12
MOVING FROM LYING ON BACK TO SITTING ON SIDE OF FLAT BED WITH BEDRAILS: A LITTLE
DAILY ACTIVITIY SCORE: 14
EATING MEALS: A LITTLE
STANDING UP FROM CHAIR USING ARMS: A LOT
MOBILITY SCORE: 10
DRESSING REGULAR LOWER BODY CLOTHING: A LOT
WALKING IN HOSPITAL ROOM: TOTAL
TOILETING: A LOT
MOVING TO AND FROM BED TO CHAIR: A LOT
TOILETING: A LOT
EATING MEALS: A LITTLE
DRESSING REGULAR UPPER BODY CLOTHING: A LOT
PERSONAL GROOMING: A LITTLE
WALKING IN HOSPITAL ROOM: A LOT
CLIMB 3 TO 5 STEPS WITH RAILING: TOTAL
HELP NEEDED FOR BATHING: A LOT
DRESSING REGULAR UPPER BODY CLOTHING: A LOT
STANDING UP FROM CHAIR USING ARMS: A LOT
CLIMB 3 TO 5 STEPS WITH RAILING: TOTAL
MOVING TO AND FROM BED TO CHAIR: A LOT
DAILY ACTIVITIY SCORE: 14
TURNING FROM BACK TO SIDE WHILE IN FLAT BAD: A LOT

## 2025-02-06 ASSESSMENT — PAIN SCALES - GENERAL
PAINLEVEL_OUTOF10: 6
PAINLEVEL_OUTOF10: 2
PAINLEVEL_OUTOF10: 4
PAINLEVEL_OUTOF10: 3
PAINLEVEL_OUTOF10: 10 - WORST POSSIBLE PAIN
PAINLEVEL_OUTOF10: 5 - MODERATE PAIN
PAINLEVEL_OUTOF10: 5 - MODERATE PAIN
PAINLEVEL_OUTOF10: 9
PAINLEVEL_OUTOF10: 8

## 2025-02-06 ASSESSMENT — PAIN - FUNCTIONAL ASSESSMENT
PAIN_FUNCTIONAL_ASSESSMENT: 0-10

## 2025-02-06 ASSESSMENT — PAIN DESCRIPTION - LOCATION
LOCATION: SHOULDER
LOCATION: SHOULDER

## 2025-02-06 ASSESSMENT — PAIN DESCRIPTION - DESCRIPTORS: DESCRIPTORS: ACHING;DULL;DISCOMFORT

## 2025-02-06 ASSESSMENT — PAIN DESCRIPTION - ORIENTATION: ORIENTATION: RIGHT

## 2025-02-06 ASSESSMENT — ACTIVITIES OF DAILY LIVING (ADL): HOME_MANAGEMENT_TIME_ENTRY: 15

## 2025-02-06 NOTE — DOCUMENTATION CLARIFICATION NOTE
"    PATIENT:               JODY SALINAS  ACCT #:                  [id number]  MRN:                       [id number]  :                       1950  ADMIT DATE:       2/3/2025 8:36 AM  DISCH DATE:  RESPONDING PROVIDER #:        [id number]          PROVIDER RESPONSE TEXT:    Traumatic fractures as well as Pathologic fractures    CDI QUERY TEXT:    Clarification        Instruction:    Based on your assessment of the patient and the clinical information, please provide the requested documentation by clicking on the appropriate radio button and enter any additional information if prompted.    Question: Please further clarify specify if able fracture site and type as    When answering this query, please exercise your independent professional judgment. The fact that a question is being asked, does not imply that any particular answer is desired or expected.    The patient's clinical indicators include:  Clinical Information: 74 yof s/p fall forward due to slipping on ice with right proximal humerus fracture and right pubic rami fracture/acetabular fracture    Clinical Indicators:    2/3 ED: \"Chart review shows history of hypertension, osteoarthritis, osteopenia, depression, and chronic kidney disease.   Patient states that she slipped on ice causing her to fall forward.\"    2/3 Orthopedics: \"Nonoperative management right proximal humerus fracture. Nonoperative management right pubic rami fracture/acetabular fracture, Continue paint control and ice to extremity, PT/OT: NWB to right upper extremity. Sling to be worn to at all times with the exception of skin care and hygiene. No ROM to shoulder. Okay to WBAT to right lower extremity. Follow up in outpatient orthopedic clinic in 2-3 weeks.\"    Treatment: Orthopedic consult, pain control, sling, NWB right upper extremity, WBAT to right lower extremity    Risk Factors: Osteoarthritis, osteopenia  Options provided:  -- Traumatic fractures as well as Pathologic fractures  -- " Traumatic fractures  -- Pathologic fracture  -- Other - I will add my own diagnosis  -- Refer to Clinical Documentation Reviewer    Query created by: Adelita Jones on 2/6/2025 1:41 PM      Electronically signed by:  CONNIE COLEMAN MD 2/6/2025 2:11 PM

## 2025-02-06 NOTE — CARE PLAN
The patient's goals for the shift include      The clinical goals for the shift include Pt will verbalize adequate pain relief during shift.    Over the shift, the patient did make progress toward the following goals. Barriers to progression include pain. Recommendations to address these barriers include inform RN of first onset of pain.

## 2025-02-06 NOTE — PROGRESS NOTES
ASSESSMENT & PLAN:     Mechanical fall  Right humerus fracture  Right acetabular to superior pubic ramus fracture  - Presented from home after slipping on ice, landing on her abdomen/chest without head trauma or loss of consciousness  - Found to have right humerus and right acetabular fracture  - Orthopedic surgery consulted; Non-operative management advised. NWB to RUE w/ sling worn at all times & no ROM of shoulder. WBAT to lower extremities. 2-3 week outpt follow-up advised   - PT/OT eval done, plan for snf  - Pain management as ordered, inc oxycodone frequency today     Essential hypertension  Hypertensive urgency  - Elevated blood pressures in setting of pain from fall on presentation  - Home medications were resumed  - BP control appears improved w/ resumption and pain control    VTE Prophylaxis: Lovenox SubQ    Disposition: snf    Baudilio Walker MD    SUBJECTIVE     NAEON. Still having significant pain.       OBJECTIVE:       Last Recorded Vitals:  Vitals:    02/06/25 0107 02/06/25 0440 02/06/25 0624 02/06/25 0725   BP: 119/57 128/58 138/63 159/69   BP Location:    Left arm   Patient Position: Lying   Sitting   Pulse: 68 67 67 66   Resp: 14 17  16   Temp: 36.3 °C (97.3 °F) 35.9 °C (96.6 °F) 36.5 °C (97.7 °F) 36.6 °C (97.9 °F)   TempSrc: Temporal Temporal  Temporal   SpO2: 91% 90% 95% 92%   Weight:       Height:           Last I/O:  No intake/output data recorded.    Physical Exam:  General: Well-developed elderly female in nad  HEENT: Clear sclera, EOMI, trachea midline, moist mucous membranes  Abdomen: Soft, nontender, nondistended  Extremities: RUE in sling  Neurological:no a+ox3, no fnd  Psychiatric: Appropriate mood and affect  Skin: Warm, dry    Inpatient Medications:  enoxaparin, 40 mg, subcutaneous, q24h  escitalopram, 30 mg, oral, Daily  ezetimibe, 10 mg, oral, Daily  famotidine, 40 mg, oral, Nightly  lidocaine, 1 patch, transdermal, Daily  losartan, 50 mg, oral, Daily  NIFEdipine ER, 30 mg, oral,  Daily  sennosides-docusate sodium, 1 tablet, oral, BID        PRN Medications  PRN medications: acetaminophen **OR** acetaminophen **OR** acetaminophen, hydrOXYzine HCL, morphine, ondansetron, oxyCODONE, polyethylene glycol    Continuous Medications:         LABS AND IMAGING:     Labs:  No results found for this or any previous visit (from the past 24 hours).       Imaging:  XR wrist right 3+ views  Narrative: Interpreted By:  Schoenberger, Joseph,   STUDY:  XR WRIST RIGHT 3+ VIEWS; ;  2/5/2025 11:34 am      INDICATION:  Signs/Symptoms:R wrist pain after fall.          COMPARISON:  None.      ACCESSION NUMBER(S):  [id number]      ORDERING CLINICIAN:  CONNIE COLEMAN      FINDINGS:  There is no definite acute fracture or dislocation. There is mild  chondrocalcinosis in the triangular fibrocartilage. No erosive  arthropathy. Mild DJD 1st carpometacarpal articulation.      Impression: No definite acute findings. See discussion above.          MACRO:  None      Signed by: Joseph Schoenberger 2/5/2025 11:57 AM  Dictation workstation:   [id number]

## 2025-02-06 NOTE — PROGRESS NOTES
Physical Therapy    Physical Therapy Treatment    Patient Name: Mana Avila  MRN: [id number]  Today's Date: 2/6/2025  Time Calculation  Start Time: 0915  Stop Time: 0955  Time Calculation (min): 40 min     902/902-A    Assessment/Plan   PT Assessment  PT Assessment Results: Decreased strength, Decreased endurance, Impaired balance, Decreased mobility, Decreased safety awareness  Rehab Prognosis: Fair  Barriers to Discharge Home: Physical needs  Treatment Tolerance: Patient tolerated treatment well, Patient limited by fatigue, Patient limited by pain  Medical Staff Made Aware: Yes  End of Session Communication: Bedside nurse, PCT/NA/REBECCA  Assessment Comment: Patient continues to require (A) for safety with bed mobility/transfers. Patient will benefit from additional PT to address deficits and improve mobility.  End of Session Patient Position: Bed, 3 rail up, Alarm on (Bed in chair position; Pillows for support/comfort; Call light, phone, and tray table within reach.)  PT Plan  Inpatient/Swing Bed or Outpatient: Inpatient  Treatment/Interventions: Bed mobility, Transfer training, Gait training, Balance training, Strengthening, Therapeutic exercise, Therapeutic activity   PT Frequency: 4 times per week  PT Discharge Recommendations:  (Moderate intenstiy at moderate to high frequency in 24/7 setting.)  Equipment Recommended upon Discharge:  (TBD)   PT Recommended Transfer Status: Assist x2    General Visit Information:   PT  Visit  PT Received On: 02/06/25  General  Family/Caregiver Present: Yes (Daughter arrived near end of session.)  Co-Treatment: OT  Co-Treatment Reason: Co-treat with OT to maximize patient and staff safety with bed mobility/transfers.  Prior to Session Communication: Bedside nurse, PCT/NA/REBECCA  Patient Position Received: Bed, 3 rail up, Alarm on  General Comment: Patient agreeable to therapy. Planned therapy session after patient's pain medicine had time to start working.    General Observations:    General Observation: No tele; Alarm; Sling (R)UE.    Subjective     Precautions:  Precautions  UE Weight Bearing Status: Right Non-Weight Bearing (Sling to be worn at all times with exception of skin care/hygiene.)  LE Weight Bearing Status: Weight Bearing as Tolerated (RLE)  Medical Precautions: Fall precautions    Objective     Pain:  Pain Assessment  Pain Assessment: 0-10  0-10 (Numeric) Pain Score:  (6/10 (R)UE/pelvis pain. Patient had pain medicine well in advance of therapy session. Ice packs provided after treatment.)    Cognition:  Cognition  Overall Cognitive Status: Within Functional Limits    Balance:   Static Sitting Balance  Static Sitting-Comment/Number of Minutes: F  Dynamic Sitting Balance  Dynamic Sitting-Comments: F-  Static Standing Balance  Static Standing-Comment/Number of Minutes: F- with (L)UE support + gait belt  Dynamic Standing Balance  Dynamic Standing-Comments: P+ with (L)UE support + gait belt    Treatments:           Bed Mobility  Bed Mobility: Yes  Bed Mobility 1  Bed Mobility 1: Supine to sitting, Sitting to supine  Level of Assistance 1: Maximum assistance, +2  Bed Mobility Comments 1: HOB ~40°. Hand over hand (A) to reach for the bed rail for support. (A) to lift UB from HOB while moving LEs over the EOB. (A) to support UB while lifting LEs onto the bed. Patient instructed in bridging 5x during bed mobility to readjust positioning with transfers to/from EOB.  Ambulation/Gait Training  Ambulation/Gait Training Performed: Yes  Ambulation/Gait Training 1  Surface 1: Level tile  Device 1: No device (HHA (L)UE)  Gait Support Devices: Gait belt  Assistance 1: Maximum assistance, Moderate assistance (x2)  Comments/Distance (ft) 1: ~1ft x1 with Mod(A)x2 and ~2ft x1 with Max(A)x2. WBOS with COG posterior to DIANDRA. v/c and (A) to readjust body alignment. Initially instructed patient in lateral wt shifting before progressing with advancement of each LE. Very slow movements. Decreased step  height/length B. Patient attempting to pivot heel<->toe to move each LE with side stepping towards HOB. B knee instability noted with wt shifting; blocking knees at times to prevent LOB. Fatigues quickly with exertion.  Transfers  Transfer: Yes  Transfer 1  Technique 1: Sit to stand, Stand to sit  Transfer Device 1: Gait belt (HHA x1 (L)UE)  Trials/Comments 1: (2x). v/c for safe hand placement and technique. Slow transition with transitions.             Outcome Measures:     [facility] Basic Mobility  Turning from your back to your side while in a flat bed without using bedrails: A lot  Moving from lying on your back to sitting on the side of a flat bed without using bedrails: A lot  Moving to and from bed to chair (including a wheelchair): A lot  Standing up from a chair using your arms (e.g. wheelchair or bedside chair): A lot  To walk in hospital room: Total  Climbing 3-5 steps with railing: Total  Basic Mobility - Total Score: 10                                      Education Documentation  Precautions, taught by Pat Nice PTA at 2/6/2025  1:47 PM.  Learner: Patient  Readiness: Acceptance  Method: Explanation, Demonstration, Teach-back  Response: Verbalizes Understanding, Needs Reinforcement  Comment: See therapy note.    Mobility Training, taught by Pat Nice PTA at 2/6/2025  1:47 PM.  Learner: Patient  Readiness: Acceptance  Method: Explanation, Demonstration, Teach-back  Response: Verbalizes Understanding, Needs Reinforcement  Comment: See therapy note.             EDUCATION:  Individual(s) Educated: Patient, Child  Education Provided: Body Mechanics, Fall Risk, Home Exercise Program, POC, Posture (Balance; Safety with bed mobility/transfers.)  Patient Response to Education: Patient/Caregiver Verbalized Understanding of Information, Patient/Caregiver Performed Return Demonstration of Exercises/Activities, Patient/Caregiver Asked Appropriate Questions    Encounter Problems       Encounter Problems  (Active)       PT Problem       Bed mobility supine <> sit + 1 moderate assist  (Progressing)       Start:  02/04/25    Expected End:  02/18/25            Transfers sit <> stand /bed <> chair with QC v pyramid walker + 1 moderate assist (Progressing)       Start:  02/04/25    Expected End:  02/18/25            Patient to ambulate 10' with QC v pyramid walker + 1 moderate assist  (Progressing)       Start:  02/04/25    Expected End:  02/18/25

## 2025-02-06 NOTE — CARE PLAN
The patient's goals for the shift include      The clinical goals for the shift include pt will have reduced pain from 10/10 to 6/10 by end of shift

## 2025-02-06 NOTE — PROGRESS NOTES
Occupational Therapy    OT Treatment    Patient Name: Mana Avila  MRN: [id number]  Department: [facility]  Room: [address] [place] [address]  Today's Date: 2/6/2025  Time Calculation  Start Time: 0916  Stop Time: 0956  Time Calculation (min): 40 min        Assessment:  End of Session Communication: Bedside nurse  End of Session Patient Position: Bed, 3 rail up, Alarm on (all needs met, call light within reach)     Plan:  Treatment Interventions: ADL retraining, Functional transfer training, Endurance training, Patient/family training  OT Frequency: 2 times per week  OT Discharge Recommendations: Moderate intensity level of continued care  OT Recommended Transfer Status: Maximum assist, Assist of 2  OT - OK to Discharge: Yes  Treatment Interventions: ADL retraining, Functional transfer training, Endurance training, Patient/family training    Subjective   Previous Visit Info:  OT Last Visit  OT Received On: 02/06/25  General:  General  Past Medical History Relevant to Rehab: HTN, Arthritis, Appey  Family/Caregiver Present: Yes (Daughter arrived near end of session.)  Co-Treatment: PT  Co-Treatment Reason: To maximize pt and staff safety while completing discipline specific treatments. Pt currently requires assist x 2 for transfers and mobility.  Prior to Session Communication: Bedside nurse, PCT/NA/CTA  Patient Position Received: Bed, 3 rail up, Alarm on  General Comment: Patient agreeable to therapy. Planned therapy session after patient's pain medicine had time to start working.  Precautions:  UE Weight Bearing Status: Right Non-Weight Bearing (Sling to be worn at all times with exception of skin care/hygiene.  Pt educated on precautions, verbalized understanding.  Pt maintains NWB with RUE, sling on throughout tx.)  LE Weight Bearing Status: Weight Bearing as Tolerated (RLE)  Medical Precautions: Fall precautions            Pain:  Pain Assessment  Pain Assessment: 0-10  0-10 (Numeric) Pain Score:  (6/10 (R)UE/pelvis pain. Patient had pain  medicine well in advance of therapy session. Ice packs provided after treatment.)    Objective    Cognition:  Cognition  Overall Cognitive Status: Within Functional Limits  Orientation Level: Oriented X4       Activities of Daily Living: Toileting  Toileting Comments: Pt positioned on bed pan upon arrival.  Pt able to bridge to allow PCT to remove bed pan.  Pt also able to complete pericare (anterior/posterior) by bridging 2 additional times       Bed Mobility/Transfers: Bed Mobility 1  Bed Mobility 1: Supine to sitting, Sitting to supine  Level of Assistance 1: Maximum assistance, +2  Bed Mobility Comments 1: HOB ~40°. Hand over hand (A) to reach for the bed rail for support. (A) to lift UB from HOB while moving LEs over the EOB. (A) to support UB while lifting LEs onto the bed. Patient instructed in bridging 5x during bed mobility to readjust positioning with transfers to/from EOB.    Transfers  Transfer: Yes  Transfer 1  Technique 1: Sit to stand, Stand to sit  Transfer Device 1: Gait belt (L UE HHA)  Trials/Comments 1: (2x). v/c for safe hand placement and technique. Slow transition with transitions.      Therapy/Activity: Therapeutic Exercise  Therapeutic Exercise Activity 1: AAROM R elbow 1x5 reps ~30-40° tolerance.  AROM wrist/digits 1x10 reps, encouragement and reassurance provided throughout    Balance/Neuromuscular Re-Education  Balance/Neuromuscular Re-Education Activity 1: Standing trials x 2 for 1-3 min with LUE support and gait belt.  Weight shifting L<>R with mod assist x 2 to maintain balance.  Pt with b/l knee instability and buckling intermittently.  Side steps x 2 trials with rest break between.  1st trial ~2 steps with mod assist x 2 and left knee blocked, 2nd trial with max assist x 2 and b/l knees blocked d/t buckling.      Outcome Measures:[facility] Daily Activity  Putting on and taking off regular lower body clothing: A lot  Bathing (including washing, rinsing, drying): A lot  Putting on and  taking off regular upper body clothing: A lot  Toileting, which includes using toilet, bedpan or urinal: A lot  Taking care of personal grooming such as brushing teeth: A little  Eating Meals: A little  Daily Activity - Total Score: 14        Education Documentation  ADL Training, taught by Jennifer L Felty, OTA at 2/6/2025  5:01 PM.  Learner: Patient  Readiness: Acceptance  Method: Explanation, Demonstration  Response: Needs Reinforcement     EDUCATION:body mechanics, transfer techniques, balance improvement strategies        Goals:  Encounter Problems       Encounter Problems (Active)       OT Goals       pt will complete R elbow, wrist and hand ROM indep (Progressing)       Start:  02/04/25    Expected End:  02/18/25            Pt will bathe/dress UB with min A (Progressing)       Start:  02/04/25    Expected End:  02/18/25            Pt will dress LB with min A (Progressing)       Start:  02/04/25    Expected End:  02/18/25            Pt will transfer to bed, chair ,toilet with min A (Progressing)       Start:  02/04/25    Expected End:  02/18/25

## 2025-02-07 VITALS
HEART RATE: 70 BPM | HEIGHT: 64 IN | SYSTOLIC BLOOD PRESSURE: 137 MMHG | RESPIRATION RATE: 18 BRPM | TEMPERATURE: 97.7 F | WEIGHT: 144.84 LBS | BODY MASS INDEX: 24.73 KG/M2 | DIASTOLIC BLOOD PRESSURE: 63 MMHG | OXYGEN SATURATION: 91 %

## 2025-02-07 PROCEDURE — 97110 THERAPEUTIC EXERCISES: CPT | Mod: GP,CQ

## 2025-02-07 PROCEDURE — 97530 THERAPEUTIC ACTIVITIES: CPT | Mod: GP,CQ

## 2025-02-07 PROCEDURE — 99239 HOSP IP/OBS DSCHRG MGMT >30: CPT | Performed by: INTERNAL MEDICINE

## 2025-02-07 PROCEDURE — 2500000002 HC RX 250 W HCPCS SELF ADMINISTERED DRUGS (ALT 637 FOR MEDICARE OP, ALT 636 FOR OP/ED): Performed by: STUDENT IN AN ORGANIZED HEALTH CARE EDUCATION/TRAINING PROGRAM

## 2025-02-07 PROCEDURE — 2500000005 HC RX 250 GENERAL PHARMACY W/O HCPCS: Performed by: STUDENT IN AN ORGANIZED HEALTH CARE EDUCATION/TRAINING PROGRAM

## 2025-02-07 PROCEDURE — 2500000001 HC RX 250 WO HCPCS SELF ADMINISTERED DRUGS (ALT 637 FOR MEDICARE OP): Performed by: INTERNAL MEDICINE

## 2025-02-07 PROCEDURE — 2500000001 HC RX 250 WO HCPCS SELF ADMINISTERED DRUGS (ALT 637 FOR MEDICARE OP): Performed by: STUDENT IN AN ORGANIZED HEALTH CARE EDUCATION/TRAINING PROGRAM

## 2025-02-07 RX ORDER — ACETAMINOPHEN 325 MG/1
975 TABLET ORAL 3 TIMES DAILY
Status: DISCONTINUED | OUTPATIENT
Start: 2025-02-07 | End: 2025-02-07 | Stop reason: HOSPADM

## 2025-02-07 RX ORDER — AMOXICILLIN 250 MG
1 CAPSULE ORAL 2 TIMES DAILY
Start: 2025-02-07

## 2025-02-07 RX ORDER — ACETAMINOPHEN 325 MG/1
975 TABLET ORAL 3 TIMES DAILY
Start: 2025-02-07

## 2025-02-07 RX ORDER — OXYCODONE HYDROCHLORIDE 5 MG/1
5 TABLET ORAL EVERY 6 HOURS PRN
Status: DISCONTINUED | OUTPATIENT
Start: 2025-02-07 | End: 2025-02-07 | Stop reason: HOSPADM

## 2025-02-07 RX ORDER — LIDOCAINE 560 MG/1
1 PATCH PERCUTANEOUS; TOPICAL; TRANSDERMAL DAILY
Start: 2025-02-08

## 2025-02-07 RX ORDER — OXYCODONE HYDROCHLORIDE 5 MG/1
5 TABLET ORAL EVERY 6 HOURS PRN
Qty: 12 TABLET | Refills: 0 | Status: SHIPPED | OUTPATIENT
Start: 2025-02-07 | End: 2025-02-10

## 2025-02-07 RX ORDER — CYCLOBENZAPRINE HCL 10 MG
5 TABLET ORAL 3 TIMES DAILY PRN
Status: DISCONTINUED | OUTPATIENT
Start: 2025-02-07 | End: 2025-02-07 | Stop reason: HOSPADM

## 2025-02-07 RX ORDER — CYCLOBENZAPRINE HCL 5 MG
5 TABLET ORAL 3 TIMES DAILY PRN
Start: 2025-02-07

## 2025-02-07 RX ORDER — POLYETHYLENE GLYCOL 3350 17 G/17G
17 POWDER, FOR SOLUTION ORAL DAILY PRN
Start: 2025-02-07

## 2025-02-07 RX ADMIN — DOCUSATE SODIUM 50MG AND SENNOSIDES 8.6MG 1 TABLET: 8.6; 5 TABLET, FILM COATED ORAL at 08:18

## 2025-02-07 RX ADMIN — NIFEDIPINE 30 MG: 30 TABLET, FILM COATED, EXTENDED RELEASE ORAL at 06:02

## 2025-02-07 RX ADMIN — ESCITALOPRAM OXALATE 30 MG: 10 TABLET, FILM COATED ORAL at 06:02

## 2025-02-07 RX ADMIN — OXYCODONE HYDROCHLORIDE 5 MG: 5 TABLET ORAL at 07:57

## 2025-02-07 RX ADMIN — OXYCODONE HYDROCHLORIDE 5 MG: 5 TABLET ORAL at 14:58

## 2025-02-07 RX ADMIN — LOSARTAN POTASSIUM 50 MG: 50 TABLET, FILM COATED ORAL at 06:02

## 2025-02-07 RX ADMIN — LIDOCAINE 4% 1 PATCH: 40 PATCH TOPICAL at 08:17

## 2025-02-07 RX ADMIN — ACETAMINOPHEN 975 MG: 325 TABLET ORAL at 10:41

## 2025-02-07 RX ADMIN — OXYCODONE HYDROCHLORIDE 5 MG: 5 TABLET ORAL at 03:45

## 2025-02-07 RX ADMIN — EZETIMIBE 10 MG: 10 TABLET ORAL at 06:02

## 2025-02-07 ASSESSMENT — COGNITIVE AND FUNCTIONAL STATUS - GENERAL
STANDING UP FROM CHAIR USING ARMS: A LOT
TURNING FROM BACK TO SIDE WHILE IN FLAT BAD: A LOT
MOVING FROM LYING ON BACK TO SITTING ON SIDE OF FLAT BED WITH BEDRAILS: A LOT
CLIMB 3 TO 5 STEPS WITH RAILING: TOTAL
WALKING IN HOSPITAL ROOM: TOTAL
MOBILITY SCORE: 10
MOVING TO AND FROM BED TO CHAIR: A LOT

## 2025-02-07 ASSESSMENT — PAIN - FUNCTIONAL ASSESSMENT: PAIN_FUNCTIONAL_ASSESSMENT: 0-10

## 2025-02-07 ASSESSMENT — PAIN SCALES - GENERAL
PAINLEVEL_OUTOF10: 7
PAINLEVEL_OUTOF10: 10 - WORST POSSIBLE PAIN
PAINLEVEL_OUTOF10: 8
PAINLEVEL_OUTOF10: 2

## 2025-02-07 NOTE — CARE PLAN
The patient's goals for the shift include      The clinical goals for the shift include pt will have reduced pain from 10/10 to 6/10 by end of shift  Pain is controlled with meds.

## 2025-02-07 NOTE — DISCHARGE SUMMARY
DISCHARGE DIAGNOSIS     Mechanical fall  Right humerus fracture  Right acetabular to superior pubic ramus fracture  Respiratory insufficiency 2/2 atelectasis  HTN    HOSPITAL COURSE AND DETAILS     Mechanical fall  Right humerus fracture  Right acetabular to superior pubic ramus fracture  - Presented from home after slipping on ice, landing on her abdomen/chest without head trauma or loss of consciousness  - Found to have right humerus and right acetabular fracture  - Orthopedic surgery consulted; Non-operative management advised. NWB to RUE w/ sling worn at all times & no ROM of shoulder. WBAT to lower extremities. 2-3 week outpt follow-up advised   - PT/OT eval done, plan for snf  - Pain management with scheduled tylenol, prn oxycodone    Acute respiratory insufficiency  -sats <88% on RA, placed on 2L O2, suspect 2/2 atelectasis, has not been doing IS  -cont IS at snf, should improve with mobilization, have RT see there    Stable for dc to SNF. Total time spent on discharge services 31 minutes.     DISCHARGE PHYSICAL EXAM     Last Recorded Vitals:  Vitals:    02/06/25 1150 02/06/25 2022 02/07/25 0003 02/07/25 0700   BP: 121/57 132/59 141/64 137/63   BP Location:    Left arm   Patient Position: Sitting  Sitting Sitting   Pulse: 72 65 61 70   Resp: 20 17 18    Temp: 36.3 °C (97.3 °F) 35.7 °C (96.3 °F) 36.5 °C (97.7 °F) 36.5 °C (97.7 °F)   TempSrc:    Temporal   SpO2: 91% 95% 94% 91%   Weight:       Height:           Physical Exam:  General: Well-developed elderly female in nad  HEENT: Clear sclera, EOMI, trachea midline, moist mucous membranes  Abdomen: Soft, nontender, nondistended  Extremities: RUE in sling  Neurological:no a+ox3, no fnd  Psychiatric: Appropriate mood and affect  Skin: Warm, dry      PERTINENT LABS AND IMAGING     Results for orders placed or performed during the hospital encounter of 02/03/25 (from the past 96 hours)   Troponin, High Sensitivity, 1 Hour   Result Value Ref Range    Troponin I,  High Sensitivity 5 0 - 13 ng/L   Basic Metabolic Panel   Result Value Ref Range    Glucose 94 74 - 99 mg/dL    Sodium 141 136 - 145 mmol/L    Potassium 4.2 3.5 - 5.3 mmol/L    Chloride 108 (H) 98 - 107 mmol/L    Bicarbonate 29 21 - 32 mmol/L    Anion Gap 8 (L) 10 - 20 mmol/L    Urea Nitrogen 18 6 - 23 mg/dL    Creatinine 1.09 (H) 0.50 - 1.05 mg/dL    eGFR 54 (L) >60 mL/min/1.73m*2    Calcium 8.8 8.6 - 10.3 mg/dL   CBC and Auto Differential   Result Value Ref Range    WBC 5.7 4.4 - 11.3 x10*3/uL    nRBC 0.0 0.0 - 0.0 /100 WBCs    RBC 4.09 4.00 - 5.20 x10*6/uL    Hemoglobin 11.7 (L) 12.0 - 16.0 g/dL    Hematocrit 35.9 (L) 36.0 - 46.0 %    MCV 88 80 - 100 fL    MCH 28.6 26.0 - 34.0 pg    MCHC 32.6 32.0 - 36.0 g/dL    RDW 13.0 11.5 - 14.5 %    Platelets 177 150 - 450 x10*3/uL    Neutrophils % 63.5 40.0 - 80.0 %    Immature Granulocytes %, Automated 0.2 0.0 - 0.9 %    Lymphocytes % 24.2 13.0 - 44.0 %    Monocytes % 9.9 2.0 - 10.0 %    Eosinophils % 1.8 0.0 - 6.0 %    Basophils % 0.4 0.0 - 2.0 %    Neutrophils Absolute 3.59 1.60 - 5.50 x10*3/uL    Immature Granulocytes Absolute, Automated 0.01 0.00 - 0.50 x10*3/uL    Lymphocytes Absolute 1.37 0.80 - 3.00 x10*3/uL    Monocytes Absolute 0.56 0.05 - 0.80 x10*3/uL    Eosinophils Absolute 0.10 0.00 - 0.40 x10*3/uL    Basophils Absolute 0.02 0.00 - 0.10 x10*3/uL   Magnesium   Result Value Ref Range    Magnesium 2.31 1.60 - 2.40 mg/dL   SST TOP   Result Value Ref Range    Extra Tube Hold for add-ons.    Basic Metabolic Panel   Result Value Ref Range    Glucose 94 74 - 99 mg/dL    Sodium 139 136 - 145 mmol/L    Potassium 4.2 3.5 - 5.3 mmol/L    Chloride 104 98 - 107 mmol/L    Bicarbonate 30 21 - 32 mmol/L    Anion Gap 9 (L) 10 - 20 mmol/L    Urea Nitrogen 16 6 - 23 mg/dL    Creatinine 1.07 (H) 0.50 - 1.05 mg/dL    eGFR 55 (L) >60 mL/min/1.73m*2    Calcium 8.7 8.6 - 10.3 mg/dL   CBC and Auto Differential   Result Value Ref Range    WBC 6.1 4.4 - 11.3 x10*3/uL    nRBC 0.0 0.0 - 0.0  /100 WBCs    RBC 3.96 (L) 4.00 - 5.20 x10*6/uL    Hemoglobin 11.4 (L) 12.0 - 16.0 g/dL    Hematocrit 35.0 (L) 36.0 - 46.0 %    MCV 88 80 - 100 fL    MCH 28.8 26.0 - 34.0 pg    MCHC 32.6 32.0 - 36.0 g/dL    RDW 12.9 11.5 - 14.5 %    Platelets 181 150 - 450 x10*3/uL    Neutrophils % 62.4 40.0 - 80.0 %    Immature Granulocytes %, Automated 0.3 0.0 - 0.9 %    Lymphocytes % 25.7 13.0 - 44.0 %    Monocytes % 8.8 2.0 - 10.0 %    Eosinophils % 2.3 0.0 - 6.0 %    Basophils % 0.5 0.0 - 2.0 %    Neutrophils Absolute 3.82 1.60 - 5.50 x10*3/uL    Immature Granulocytes Absolute, Automated 0.02 0.00 - 0.50 x10*3/uL    Lymphocytes Absolute 1.57 0.80 - 3.00 x10*3/uL    Monocytes Absolute 0.54 0.05 - 0.80 x10*3/uL    Eosinophils Absolute 0.14 0.00 - 0.40 x10*3/uL    Basophils Absolute 0.03 0.00 - 0.10 x10*3/uL   Magnesium   Result Value Ref Range    Magnesium 2.13 1.60 - 2.40 mg/dL   SST TOP   Result Value Ref Range    Extra Tube Hold for add-ons.         XR wrist right 3+ views   Final Result   No definite acute findings. See discussion above.             MACRO:   None        Signed by: Joseph Schoenberger 2/5/2025 11:57 AM   Dictation workstation:   [id number]      CT hip right wo IV contrast   Final Result   Addendum (preliminary) 1 of 1   Interpreted By:  Jolie Mei,    ADDENDUM:   On further imaging review, there is a tiny crescentic shaped calcific   density along the inferior margin of the pubic symphysis at the   midline suggestive of an acute-subacute minimally displaced avulsion   fracture.        Signed by: Jolie Mei 2/3/2025 11:38 AM        -------- ORIGINAL REPORT --------   Dictation workstation:   [id number]      Final   Minimally displaced fracture anterior acetabulum extending into base   of superior pubic ramus.        MACRO:   None        Signed by: Jolie Mei 2/3/2025 11:33 AM   Dictation workstation:   [id number]      XR hip right with pelvis when performed 2 or 3 views   Final Result   No  acute findings.        MACRO:   None        Signed by: Arden Michael 2/3/2025 10:11 AM   Dictation workstation:   [id number]      XR shoulder right 2+ views   Final Result   Humeral fracture.        MACRO:   None        Signed by: Arden Michael 2/3/2025 10:15 AM   Dictation workstation:   [id number]      XR humerus right   Final Result   Humeral fracture.        MACRO:   None        Signed by: Arden Michael 2/3/2025 10:15 AM   Dictation workstation:   [id number]      XR clavicle right   Final Result   Humeral fracture.        MACRO:   None        Signed by: Arden Michael 2/3/2025 10:15 AM   Dictation workstation:   [id number]      XR chest 1 view   Final Result   1.  Left lower lung atelectasis or fibrosis.   2. Right humeral fracture.        Signed by: Arden Michael 2/3/2025 10:12 AM   Dictation workstation:   [id number]      CT head wo IV contrast   Final Result   NO ACUTE INTRACRANIAL PROCESS. SKULL INTACT        NO ACUTE FRACTURE OR SUBLUXATION IN THE CERVICAL SPINE        THIS REPORT SERVES AS THE DIAGNOSTIC INTERPRETATION FOR TWO EXAMS   PERFORMED CONCURRENTLY: CT BRAIN WITHOUT IV CONTRAST AND CT CERVICAL   SPINE WITHOUT IV CONTRAST        MACRO:   None        Signed by: Israel Cruz 2/3/2025 9:46 AM   Dictation workstation:   [id number]      CT cervical spine wo IV contrast   Final Result   NO ACUTE INTRACRANIAL PROCESS. SKULL INTACT        NO ACUTE FRACTURE OR SUBLUXATION IN THE CERVICAL SPINE        THIS REPORT SERVES AS THE DIAGNOSTIC INTERPRETATION FOR TWO EXAMS   PERFORMED CONCURRENTLY: CT BRAIN WITHOUT IV CONTRAST AND CT CERVICAL   SPINE WITHOUT IV CONTRAST        MACRO:   None        Signed by: Israel Cruz 2/3/2025 9:46 AM   Dictation workstation:   [id number]          No echocardiogram results found for the past 14 days    DISCHARGE MEDICATIONS        Your medication list        START taking these medications        Instructions Last Dose Given Next Dose Due   acetaminophen 325 mg tablet  Commonly  known as: Tylenol      Take 3 tablets (975 mg) by mouth 3 times a day.       cyclobenzaprine 5 mg tablet  Commonly known as: Flexeril      Take 1 tablet (5 mg) by mouth 3 times a day as needed for muscle spasms.       lidocaine 4 % patch  Start taking on: February 8, 2025      Place 1 patch over 12 hours on the skin once daily. Remove & discard patch within 12 hours or as directed by MD.       oxyCODONE 5 mg immediate release tablet  Commonly known as: Roxicodone      Take 1 tablet (5 mg) by mouth every 6 hours if needed for severe pain (7 - 10) for up to 3 days.       polyethylene glycol 17 gram packet  Commonly known as: Glycolax, Miralax      Take 17 g by mouth once daily as needed (constipation).       sennosides-docusate sodium 8.6-50 mg tablet  Commonly known as: Sheryl-Colace      Take 1 tablet by mouth 2 times a day.              CONTINUE taking these medications        Instructions Last Dose Given Next Dose Due   CHOLECALCIFEROL (VITAMIN D3) ORAL           escitalopram 10 mg tablet  Commonly known as: Lexapro           ezetimibe 10 mg tablet  Commonly known as: Zetia           famotidine 40 mg tablet  Commonly known as: Pepcid           fluticasone 50 mcg/actuation nasal spray  Commonly known as: Flonase           losartan 50 mg tablet  Commonly known as: Cozaar           NIFEdipine ER 30 mg 24 hr tablet  Commonly known as: Adalat CC           pantoprazole 40 mg EC tablet  Commonly known as: ProtoNix                     Where to Get Your Medications        You can get these medications from any pharmacy    Bring a paper prescription for each of these medications  oxyCODONE 5 mg immediate release tablet       Information about where to get these medications is not yet available    Ask your nurse or doctor about these medications  acetaminophen 325 mg tablet  cyclobenzaprine 5 mg tablet  lidocaine 4 % patch  polyethylene glycol 17 gram packet  sennosides-docusate sodium 8.6-50 mg tablet         OUTPATIENT  FOLLOW-UP     No future appointments.

## 2025-02-07 NOTE — PROGRESS NOTES
02/07/25 0955   Discharge Planning   Home or Post Acute Services Post acute facilities (Rehab/SNF/etc)   Type of Post Acute Facility Services Skilled nursing   Expected Discharge Disposition SNF  ([facility])   Does the patient need discharge transport arranged? Yes   RoundTrip coordination needed? Yes   Patient Choice   Provider Choice list and CMS website (https://medicare.gov/care-compare#search) for post-acute Quality and Resource Measure Data were provided and reviewed with: Family;Patient   Patient / Family choosing to utilize agency / facility established prior to hospitalization No   Stroke Family Assessment   Stroke Family Assessment Needed No   Intensity of Service   Intensity of Service 0-30 min     TCC spoke with pt and family at bedside last night. They had requested referrals to [facility] and [facility] in [place]. Referrals sent in Careport, all facilities are able to accept pt and have beds. TCC updated pt and family who state [facility] SNF is FOC. Facility updated of planned dc later today.    UPDATE 1022:  Medically dc for today. [place] completed and signed, sent in Careport with AVS, MAR, and DC Summary. SW assisting with completion of HENS and setting up transportation. Pt and family updated. Bedside RN given report number. Facility updated.

## 2025-02-07 NOTE — PROGRESS NOTES
Physical Therapy    Physical Therapy Treatment    Patient Name: Mana Avila  MRN: [id number]  Today's Date: 2/7/2025  Time Calculation  Start Time: 0950  Stop Time: 1032  Time Calculation (min): 42 min     902/902-A    Assessment/Plan   PT Assessment  PT Assessment Results: Decreased strength, Decreased endurance, Impaired balance, Decreased mobility, Decreased safety awareness  Rehab Prognosis: Good  Barriers to Discharge Home: Physical needs  Treatment Tolerance: Patient tolerated treatment well, Patient limited by pain, Patient limited by fatigue  Medical Staff Made Aware: Yes  End of Session Communication: Bedside nurse, PCT/NA/CTA  Assessment Comment: Patient continues to require (A) for safety with bed mobility/transfers. Patient will benefit from additional PT to address deficits and improve mobility.  End of Session Patient Position: Up in chair, Alarm on ((B)LE elevated on foot stool with pillow; Call light/phone within reach; Daughter at chairside visiting.)  PT Plan  Inpatient/Swing Bed or Outpatient: Inpatient  Treatment/Interventions: Bed mobility, Transfer training, Gait training, Balance training, Strengthening, Therapeutic exercise, Therapeutic activity   PT Frequency: 4 times per week  PT Discharge Recommendations:  (Moderate intenstiy at moderate to high frequency in 24/7 setting.)  Equipment Recommended upon Discharge:  (TBD)   PT Recommended Transfer Status: Assist x2, gait belt    General Visit Information:   PT  Visit  PT Received On: 02/07/25  General  Family/Caregiver Present: Yes (Daughter present and supportive.)  Prior to Session Communication: Bedside nurse, PCT/NA/CTA  Patient Position Received: Bed, 3 rail up, Alarm on  General Comment: Patient agreeable therapy. Per nursing, patient was medicated for pain well in advance of therapy treatment.    General Observations:   General Observation: No tele; O2 via NC; Purewick; Alarm; Sling (R)UE.    Subjective     Precautions:  Precautions  UE  Weight Bearing Status: Right Non-Weight Bearing (RUE)  LE Weight Bearing Status: Weight Bearing as Tolerated (RLE)  Medical Precautions: Fall precautions  Precautions Comment: RUE in simple sling; No ROM (R)shoulder, sling to be worn at all times with exception of skin care/hygiene.    Objective     Pain:  Pain Assessment  Pain Assessment:  (8/10 (R)UE/pelvis pain; same throughout session. Nursing aware.)  0-10 (Numeric) Pain Score:  (6/10 (R)UE/pelvis pain. Patient had pain medicine well in advance of therapy session. Ice packs provided after treatment.)    Cognition:  Cognition  Overall Cognitive Status: Within Functional Limits    Balance:   Static Sitting Balance  Static Sitting-Comment/Number of Minutes: F  Dynamic Sitting Balance  Dynamic Sitting-Comments: F-  Static Standing Balance  Static Standing-Comment/Number of Minutes: F- with (L)UE support + gait belt  Dynamic Standing Balance  Dynamic Standing-Comments: P+ with (L)UE support + gait belt    Treatments:  Therapeutic Exercise  Therapeutic Exercise Performed: Yes  Therapeutic Exercise Activity 1: Instructed patient and her daughter on in supine and seated ther ex. AROM (B)LE AP x5 and AAROM (R)LE Heelslides/Abd-add/SAQ x3-4. Educated patient on performing exercises on her own outside of therapy session to increase movement and that her daughter could assist her as needed. Advised that all exercises with LEs are beneficial and to perform within pain tolerance; only restriction from MD is no (R)shoulder ROM. Patient and daughter verbalized understanding and willingness to do what they can.     Balance/Neuromuscular Re-Education  Balance/Neuromuscular Re-Education Activity Performed: Yes  Balance/Neuromuscular Re-Education Activity 1: Multi-level dynamic reaching (L/R/C/Above head/Below waist/Across midline) with x1UE support.  Bed Mobility  Bed Mobility: Yes  Bed Mobility 1  Bed Mobility 1: Supine to sitting  Level of Assistance 1: Maximum assistance  Bed  Mobility Comments 1: HOB ~40°. Hand over hand (A) to reach across body to use the bed rail for support. (A) to lift UB from HOB while moving LEs over the EOB. Retropulsive. v/c to breathe through transitional movements 2° patient tends to hold her breath/tense up in anticipation of additional pain.  Ambulation/Gait Training  Ambulation/Gait Training Performed: Yes  Ambulation/Gait Training 1  Surface 1: Level tile  Device 1:  (Arm in Arm (L)side.)  Gait Support Devices: Gait belt  Assistance 1: Moderate assistance (x2)  Comments/Distance (ft) 1: ~2ft bed to chair. WBOS. v/c for direction of movement. Slow with wt shifting. COG posterior to DIANDRA, but corrections made with v/c and (A). Difficulty advancing (R)LE; physical (A) provided with stepping forward. Antalgic steps. Did not use pyramid walker with transfers 2° needing to be closer to patient for support/safety. No buckling noted with transfer, but patient is unsteady.  Transfers  Transfer: Yes  Transfer 1  Technique 1: Sit to stand, Stand to sit  Transfer Device 1: Gait belt (Arm in Arm (L)side)  Trials/Comments 1: (2x). v/c for safe hand placement and technique. Slow transitions. Blocking of feet to prevent sliding. v/c and (A) to shift COG forward over DIANDRA.             Outcome Measures:     [facility] Basic Mobility  Turning from your back to your side while in a flat bed without using bedrails: A lot  Moving from lying on your back to sitting on the side of a flat bed without using bedrails: A lot  Moving to and from bed to chair (including a wheelchair): A lot  Standing up from a chair using your arms (e.g. wheelchair or bedside chair): A lot  To walk in hospital room: Total  Climbing 3-5 steps with railing: Total  Basic Mobility - Total Score: 10                                      Education Documentation  Precautions, taught by Pat Nice PTA at 2/7/2025 12:31 PM.  Learner: Other, Patient  Readiness: Acceptance  Method: Explanation, Teach-back,  Demonstration  Response: Verbalizes Understanding, Needs Reinforcement  Comment: See therapy note.    Mobility Training, taught by Pat Nice PTA at 2/7/2025 12:31 PM.  Learner: Other, Patient  Readiness: Acceptance  Method: Explanation, Teach-back, Demonstration  Response: Verbalizes Understanding, Needs Reinforcement  Comment: See therapy note.           EDUCATION:  Individual(s) Educated: Patient, Child  Education Provided: Body Mechanics, Fall Risk, Home Exercise Program, POC, Posture (Balance; Safety with bed mobility/transfers.)  Equipment:  (Gait belt provided at bedside for nursing staff to use with patient transfers and for patient to take when discharged.)  Patient Response to Education: Patient/Caregiver Verbalized Understanding of Information, Patient/Caregiver Performed Return Demonstration of Exercises/Activities, Patient/Caregiver Asked Appropriate Questions    Encounter Problems       Encounter Problems (Active)       PT Problem       Bed mobility supine <> sit + 1 moderate assist  (Progressing)       Start:  02/04/25    Expected End:  02/18/25            Transfers sit <> stand /bed <> chair with QC v pyramid walker + 1 moderate assist (Progressing)       Start:  02/04/25    Expected End:  02/18/25            Patient to ambulate 10' with QC v pyramid walker + 1 moderate assist  (Not Progressing)       Start:  02/04/25    Expected End:  02/18/25

## 2025-02-07 NOTE — CARE PLAN
The patient's goals for the shift include      The clinical goals for the shift include pt will have reduced pain from 10/10 to 6/10 by end of shift  Pain has been under control with pain medication.

## 2025-02-17 NOTE — PROGRESS NOTES
Chief Complaint   Patient presents with    Pelvis - Follow-up     Closed displaced fracture of right acetabulum  DOI 2/3/25  Xray today    Right Upper Arm - Follow-up     Humeral fx  DOI 2/3/25  Xray today     History of Present Illness:  Mana Avila is a 74 y.o. female presenting to clinic as a new patient  for her right shoulder and pelvis. She was seen in the ED at [facility] on 2/3/25 after sustaining a mechanical fall on ice. She was found to have a right humerus fracture and right superior pubic ramus fracture. Overall she is doing well despite the pain. She is wearing her sling and using a walker.     Imaging:  X-rays right shoulder: Showed well aligned proximal humerus fracture, no interval change compared to prior x-rays.  X-rays pelvis: Shows a minimally displaced pubic rami fracture     Assessment:   Non-op management right proximal humerus  Non-op management pubic rami fracture    Plan:  We reviewed the role of imaging, physical therapy, injections and the time frame to healing and correlation with outcome.  Protected weightbearing as tolerated right lower extremity and non-weightbearing on right upper extremity.   Follow-up in 3 weeks with repeat x-rays of her shoulder.      Physical Exam:  Right Shoulder:  Strength / ROM: limited  Neurovascular exam normal distally       Review of Systems:  GENERAL: Negative for malaise, significant weight loss, fever  MUSCULOSKELETAL: See HPI  NEURO:  Negative     Past Medical History:   Diagnosis Date    Arthritis     Cancer (Multi)     Hypertension        Medication Documentation Review Audit       Reviewed by Julee Callejas (Technician) on 02/03/25 at 1329      Medication Order Taking? Sig Documenting Provider Last Dose Status   CHOLECALCIFEROL, VITAMIN D3, ORAL [id number] No Take by mouth once daily. Historical Provider, MD 2/1/2025 Active   escitalopram (Lexapro) 10 mg tablet [id number] Yes Take 3 tablets (30 mg) by mouth once daily. Historical Provider, MD  2025 Active   ezetimibe (Zetia) 10 mg tablet [id number] Yes Take 1 tablet (10 mg) by mouth once daily. Historical Provider, MD 2025 Active   famotidine (Pepcid) 40 mg tablet [id number] Yes Take 1 tablet (40 mg) by mouth once daily at bedtime. Historical Provider, MD 2025 Bedtime Active   fluticasone (Flonase) 50 mcg/actuation nasal spray [id number] Yes 1 spray by Does not apply route once daily as needed for rhinitis or allergies. Historical Provider, MD Unknown Active   losartan (Cozaar) 50 mg tablet [id number] Yes Take 1 tablet (50 mg) by mouth once daily. Historical Provider, MD 2025 Active   NIFEdipine CC 30 mg 24 hr tablet [id number] Yes Take 1 tablet (30 mg) by mouth once daily. Historical Provider, MD 2025 Active   pantoprazole (ProtoNix) 40 mg EC tablet [id number] Yes Take 1 tablet (40 mg) by mouth twice a day. Historical Provider, MD 2025 Active                    No Known Allergies    Social History     Socioeconomic History    Marital status:      Spouse name: Not on file    Number of children: Not on file    Years of education: Not on file    Highest education level: Not on file   Occupational History    Not on file   Tobacco Use    Smoking status: Former     Current packs/day: 0.00     Types: Cigarettes     Quit date: 1975     Years since quittin.1    Smokeless tobacco: Never   Substance and Sexual Activity    Alcohol use: Not on file    Drug use: Not on file    Sexual activity: Not on file   Other Topics Concern    Not on file   Social History Narrative    Not on file     Social Drivers of Health     Financial Resource Strain: Low Risk  (2/3/2025)    Overall Financial Resource Strain (CARDIA)     Difficulty of Paying Living Expenses: Not very hard   Food Insecurity: No Food Insecurity (2/3/2025)    Hunger Vital Sign     Worried About Running Out of Food in the Last Year: Never true     Ran Out of Food in the Last Year: Never true   Transportation Needs: No  Transportation Needs (2/3/2025)    PRAPARE - Transportation     Lack of Transportation (Medical): No     Lack of Transportation (Non-Medical): No   Physical Activity: Unknown (6/10/2024)    Received from [facility]    Exercise Vital Sign     Days of Exercise per Week: 2 days     Minutes of Exercise per Session: Not on file   Stress: No Stress Concern Present (3/21/2023)    Received from [facility]    [employer] - Occupational Stress Questionnaire     Feeling of Stress : Only a little   Social Connections: Moderately Isolated (6/10/2024)    Received from [facility]    Social Connection and Isolation Panel [NHANES]     Frequency of Communication with Friends and Family: More than three times a week     Frequency of Social Gatherings with Friends and Family: Twice a week     Attends Druze Services: Never     Active Member of Clubs or Organizations: No     Attends Club or Organization Meetings: Never     Marital Status:    Intimate Partner Violence: Not At Risk (2/3/2025)    Humiliation, Afraid, Rape, and Kick questionnaire     Fear of Current or Ex-Partner: No     Emotionally Abused: No     Physically Abused: No     Sexually Abused: No   Housing Stability: High Risk (2/3/2025)    Housing Stability Vital Sign     Unable to Pay for Housing in the Last Year: No     Number of Times Moved in the Last Year: 45     Homeless in the Last Year: No       Past Surgical History:   Procedure Laterality Date    APPENDECTOMY      FRACTURE SURGERY         XR wrist right 3+ views    Result Date: 2/5/2025  Interpreted By:  Schoenberger, Joseph, STUDY: XR WRIST RIGHT 3+ VIEWS; ;  2/5/2025 11:34 am   INDICATION: Signs/Symptoms:R wrist pain after fall.     COMPARISON: None.   ACCESSION NUMBER(S): [id number]   ORDERING CLINICIAN: CONNIE COELMAN   FINDINGS: There is no definite acute fracture or dislocation. There is mild chondrocalcinosis in the triangular fibrocartilage. No erosive  arthropathy. Mild DJD 1st carpometacarpal articulation.       No definite acute findings. See discussion above.     MACRO: None   Signed by: Joseph Schoenberger 2/5/2025 11:57 AM Dictation workstation:   [id number]    CT hip right wo IV contrast    Addendum Date: 2/3/2025    Interpreted By:  Jolie Mei, ADDENDUM: On further imaging review, there is a tiny crescentic shaped calcific density along the inferior margin of the pubic symphysis at the midline suggestive of an acute-subacute minimally displaced avulsion fracture.   Signed by: Jolie Mei 2/3/2025 11:38 AM   -------- ORIGINAL REPORT -------- Dictation workstation:   [id number]    Result Date: 2/3/2025  Interpreted By:  Jolie Mei, STUDY: CT HIP RIGHT WO IV CONTRAST;  2/3/2025 11:22 am   INDICATION: Signs/Symptoms:R hip pain, fall.   COMPARISON: Same day radiographs.   ACCESSION NUMBER(S): [id number]   ORDERING CLINICIAN: FABIO BASHIR   TECHNIQUE: CT imaging of the right hip was obtained without administration of intravenous contrast medium. Coronal and sagittal reformatted images were performed.   FINDINGS: OSSEOUS STRUCTURES: There is a minimal minimally displaced fracture of the anterior acetabulum extending into the base of the superior pubic ramus. No additional fracture is identified. No dislocation. There is mild femoroacetabular joint space narrowing posteriorly. No significant hip joint effusion is visualized.   SOFT TISSUES: No subcutaneous or intramuscular hematoma. A small fat containing right inguinal hernia is noted. No acute abnormality is present in the imaged pelvis.       Minimally displaced fracture anterior acetabulum extending into base of superior pubic ramus.   MACRO: None   Signed by: Jolie Mei 2/3/2025 11:33 AM Dictation workstation:   [id number]    XR shoulder right 2+ views    Result Date: 2/3/2025  Interpreted By:  Arden Michael, STUDY: XR CLAVICLE RIGHT; XR HUMERUS RIGHT; XR SHOULDER RIGHT 2+ VIEWS;  2/3/2025 9:57 am   INDICATION: Signs/Symptoms:pain, fall; Signs/Symptoms:R arm pain fall; Signs/Symptoms:R shoulder pain.   COMPARISON: None.   ACCESSION NUMBER(S): [id number]; [id number]; [id number]   ORDERING CLINICIAN: FABIO BASHIR   FINDINGS: Bony structures:  Slightly impacted fracture at the humeral neck, and suspected nondisplaced fracture of the greater tuberosity. The remaining bony structures appear intact.   Joint spaces:  Maintained   Soft tissues:  Unremarkable without significant edema or radiodense foreign body   Other:  None significant       Humeral fracture.   MACRO: None   Signed by: Arden Michael 2/3/2025 10:15 AM Dictation workstation:   [id number]    XR humerus right    Result Date: 2/3/2025  Interpreted By:  Arden Michael, STUDY: XR CLAVICLE RIGHT; XR HUMERUS RIGHT; XR SHOULDER RIGHT 2+ VIEWS; 2/3/2025 9:57 am   INDICATION: Signs/Symptoms:pain, fall; Signs/Symptoms:R arm pain fall; Signs/Symptoms:R shoulder pain.   COMPARISON: None.   ACCESSION NUMBER(S): [id number]; [id number]; [id number]   ORDERING CLINICIAN: FABIO BASHIR   FINDINGS: Bony structures:  Slightly impacted fracture at the humeral neck, and suspected nondisplaced fracture of the greater tuberosity. The remaining bony structures appear intact.   Joint spaces:  Maintained   Soft tissues:  Unremarkable without significant edema or radiodense foreign body   Other:  None significant       Humeral fracture.   MACRO: None   Signed by: Arden Michael 2/3/2025 10:15 AM Dictation workstation:   [id number]    XR clavicle right    Result Date: 2/3/2025  Interpreted By:  Arden iMchael, STUDY: XR CLAVICLE RIGHT; XR HUMERUS RIGHT; XR SHOULDER RIGHT 2+ VIEWS; 2/3/2025 9:57 am   INDICATION: Signs/Symptoms:pain, fall; Signs/Symptoms:R arm pain fall; Signs/Symptoms:R shoulder pain.   COMPARISON: None.   ACCESSION NUMBER(S): [id number]; [id number]; [id number]   ORDERING CLINICIAN: FABIO BASHIR   FINDINGS: Bony structures:  Slightly  impacted fracture at the humeral neck, and suspected nondisplaced fracture of the greater tuberosity. The remaining bony structures appear intact.   Joint spaces:  Maintained   Soft tissues:  Unremarkable without significant edema or radiodense foreign body   Other:  None significant       Humeral fracture.   MACRO: None   Signed by: Arden Michael 2/3/2025 10:15 AM Dictation workstation:   [id number]    XR chest 1 view    Result Date: 2/3/2025  Interpreted By:  Arden Michael, STUDY: XR CHEST 1 VIEW;  2/3/2025 9:57 am   INDICATION: Signs/Symptoms:fall.   COMPARISON: None.   ACCESSION NUMBER(S): [id number]   ORDERING CLINICIAN: FABIO BASHIR   FINDINGS: CARDIOMEDIASTINAL SILHOUETTE AND VASCULATURE:   Cardiac size:  Within normal limits. Aortic shadow:  Within normal limits.   Mediastinal contours: Within normal limits.   Pulmonary vasculature:  The central vasculature is unremarkable   LUNGS: Several linear opacities at the left lower lung medially may be due to atelectasis and/or fibrosis. The lungs otherwise are clear.   ABDOMEN AND OTHER FINDINGS: No remarkable upper abdominal findings.   BONES: Slightly impacted fracture of the right humeral neck is noted.       1.  Left lower lung atelectasis or fibrosis. 2. Right humeral fracture.   Signed by: Arden Michael 2/3/2025 10:12 AM Dictation workstation:   [id number]    XR hip right with pelvis when performed 2 or 3 views    Result Date: 2/3/2025  Interpreted By:  Arden Michael, STUDY: XR HIP RIGHT WITH PELVIS WHEN PERFORMED 2 OR 3 VIEWS;  2/3/2025 9:57 am   INDICATION: Signs/Symptoms:R hip pain, fall.   COMPARISON: None.   ACCESSION NUMBER(S): [id number]   ORDERING CLINICIAN: FABIO BASHIR   FINDINGS: Bony structures:  Intact   Joint spaces:  Maintained   Soft tissues:  Unremarkable without significant edema or radiodense foreign body   Other:  None significant       No acute findings.   MACRO: None   Signed by: Arden Michael 2/3/2025 10:11 AM Dictation workstation:    [id number]    CT head wo IV contrast    Result Date: 2/3/2025  Interpreted By:  Israel Cruz, STUDY: CT HEAD WO IV CONTRAST; CT CERVICAL SPINE WO IV CONTRAST;  2/3/2025 9:28 am   INDICATION: Signs/Symptoms:fall head injury; Signs/Symptoms:fall.     COMPARISON: None   ACCESSION NUMBER(S): [id number]; [id number]   ORDERING CLINICIAN: FABIO BASHIR   TECHNIQUE: CT of the brain from the skull vertex to the skull base, without intravenous contrast   CT cervical spine from the craniocervical junction through the cervicothoracic junction without IV contrast, including sagittal and coronal reformatted images   FINDINGS: CT BRAIN   TRAUMA-RELATED   Brain Injury (BIG) guidelines CT values:   Skull fracture: No SDH (subdural hematoma): None detected EDH (epidural hematoma): None detected IPH (intraparenchymal hemorrhage): None detected SAH (subarachnoid hemorrhage): None detected IVH (intraventricular hemorrhage): None detected   Reference: Jono RICHARDS, Pérez RS, Sea M, et al. The BIG (brain injury guidelines) project: defining the management of traumatic brain injury by acute care surgeons. J Trauma Acute Care Surg. 2014;76:020e746.   OTHER   ACUTE INTRACRANIAL MASS EFFECT:  Negative   CT EVIDENCE OF ACUTE / SUBACUTE TERRITORIAL ISCHEMIA:  Negative   VENTRICLES:  Normal caliber and configuration   OTHER BRAIN FINDINGS:  No additional findings to note   INCLUDED PARANASAL SINUSES: All clear   INCLUDED MASTOID AIR CELLS: All clear   SKULL:  No lytic or blastic lesion   EXTRACRANIAL SOFT TISSUES:  Scalp and occular globes grossly normal by CT   -------   CT CERVICAL SPINE   COUNTING REFERENCE: Craniocervical junction   CRANIOCERVICAL JUNCTION:  Intact   CERVICAL ALIGNMENT:  Rightward curvature is probably positional. No jumped facets are other acute traumatic alignment abnormality   ACUTE FRACTURE: Negative   AGGRESSIVE OSSEOUS LESION: Negative   BONY CANAL AND FORAMINA:  Disc osteophyte complexes at C3-4 and C5-6  contribute to mild and moderate bony foraminal and bony canal stenoses   PARASPINAL SOFT TISSUES:  No large acute hematoma or other acute posttraumatic finding   OTHER INCLUDED STRUCTURES:  No acute or contributory soft tissue abnormality in the other cervical and upper thoracic soft tissues       NO ACUTE INTRACRANIAL PROCESS. SKULL INTACT   NO ACUTE FRACTURE OR SUBLUXATION IN THE CERVICAL SPINE   THIS REPORT SERVES AS THE DIAGNOSTIC INTERPRETATION FOR TWO EXAMS PERFORMED CONCURRENTLY: CT BRAIN WITHOUT IV CONTRAST AND CT CERVICAL SPINE WITHOUT IV CONTRAST   MACRO: None   Signed by: Israel Cruz 2/3/2025 9:46 AM Dictation workstation:   [id number]    CT cervical spine wo IV contrast    Result Date: 2/3/2025  Interpreted By:  Israel Cruz, STUDY: CT HEAD WO IV CONTRAST; CT CERVICAL SPINE WO IV CONTRAST;  2/3/2025 9:28 am   INDICATION: Signs/Symptoms:fall head injury; Signs/Symptoms:fall.     COMPARISON: None   ACCESSION NUMBER(S): [id number]; [id number]   ORDERING CLINICIAN: FABIO BASHIR   TECHNIQUE: CT of the brain from the skull vertex to the skull base, without intravenous contrast   CT cervical spine from the craniocervical junction through the cervicothoracic junction without IV contrast, including sagittal and coronal reformatted images   FINDINGS: CT BRAIN   TRAUMA-RELATED   Brain Injury (BIG) guidelines CT values:   Skull fracture: No SDH (subdural hematoma): None detected EDH (epidural hematoma): None detected IPH (intraparenchymal hemorrhage): None detected SAH (subarachnoid hemorrhage): None detected IVH (intraventricular hemorrhage): None detected   Reference: Jono RICHARDS, Pérez RS, Sea M, et al. The BIG (brain injury guidelines) project: defining the management of traumatic brain injury by acute care surgeons. J Trauma Acute Care Surg. 2014;76:482e634.   OTHER   ACUTE INTRACRANIAL MASS EFFECT:  Negative   CT EVIDENCE OF ACUTE / SUBACUTE TERRITORIAL ISCHEMIA:  Negative   VENTRICLES:  Normal caliber  and configuration   OTHER BRAIN FINDINGS:  No additional findings to note   INCLUDED PARANASAL SINUSES: All clear   INCLUDED MASTOID AIR CELLS: All clear   SKULL:  No lytic or blastic lesion   EXTRACRANIAL SOFT TISSUES:  Scalp and occular globes grossly normal by CT   -------   CT CERVICAL SPINE   COUNTING REFERENCE: Craniocervical junction   CRANIOCERVICAL JUNCTION:  Intact   CERVICAL ALIGNMENT:  Rightward curvature is probably positional. No jumped facets are other acute traumatic alignment abnormality   ACUTE FRACTURE: Negative   AGGRESSIVE OSSEOUS LESION: Negative   BONY CANAL AND FORAMINA:  Disc osteophyte complexes at C3-4 and C5-6 contribute to mild and moderate bony foraminal and bony canal stenoses   PARASPINAL SOFT TISSUES:  No large acute hematoma or other acute posttraumatic finding   OTHER INCLUDED STRUCTURES:  No acute or contributory soft tissue abnormality in the other cervical and upper thoracic soft tissues       NO ACUTE INTRACRANIAL PROCESS. SKULL INTACT   NO ACUTE FRACTURE OR SUBLUXATION IN THE CERVICAL SPINE   THIS REPORT SERVES AS THE DIAGNOSTIC INTERPRETATION FOR TWO EXAMS PERFORMED CONCURRENTLY: CT BRAIN WITHOUT IV CONTRAST AND CT CERVICAL SPINE WITHOUT IV CONTRAST   MACRO: None   Signed by: Israel Cruz 2/3/2025 9:46 AM Dictation workstation:   [id number]    ECG 12 lead    Result Date: 2/3/2025  Normal sinus rhythm Nonspecific ST abnormality Abnormal ECG When compared with ECG of 03-FEB-2025 09:04, (unconfirmed) No significant change was found         Scribe Attestation  By signing my name below, [email or web address], Scribe   attest that this documentation has been prepared under the direction and in the presence of Diego Camacho MD.

## 2025-02-18 ENCOUNTER — HOSPITAL ENCOUNTER (OUTPATIENT)
Dept: RADIOLOGY | Facility: CLINIC | Age: 75
Discharge: HOME | End: 2025-02-18
Payer: MEDICARE

## 2025-02-18 ENCOUNTER — OFFICE VISIT (OUTPATIENT)
Dept: ORTHOPEDIC SURGERY | Facility: CLINIC | Age: 75
End: 2025-02-18
Payer: MEDICARE

## 2025-02-18 DIAGNOSIS — R10.2 PAIN IN FEMALE PELVIS: ICD-10-CM

## 2025-02-18 DIAGNOSIS — M25.511 RIGHT SHOULDER PAIN, UNSPECIFIED CHRONICITY: ICD-10-CM

## 2025-02-18 PROCEDURE — 1111F DSCHRG MED/CURRENT MED MERGE: CPT | Performed by: ORTHOPAEDIC SURGERY

## 2025-02-18 PROCEDURE — 99211 OFF/OP EST MAY X REQ PHY/QHP: CPT | Performed by: ORTHOPAEDIC SURGERY

## 2025-02-18 PROCEDURE — 73030 X-RAY EXAM OF SHOULDER: CPT | Mod: RT

## 2025-02-18 PROCEDURE — 73030 X-RAY EXAM OF SHOULDER: CPT | Mod: RIGHT SIDE | Performed by: ORTHOPAEDIC SURGERY

## 2025-02-18 PROCEDURE — 99024 POSTOP FOLLOW-UP VISIT: CPT | Performed by: ORTHOPAEDIC SURGERY

## 2025-02-18 PROCEDURE — 72190 X-RAY EXAM OF PELVIS: CPT

## 2025-02-26 LAB
ATRIAL RATE: 67 BPM
P AXIS: 72 DEGREES
P OFFSET: 185 MS
P ONSET: 134 MS
PR INTERVAL: 178 MS
Q ONSET: 223 MS
QRS COUNT: 11 BEATS
QRS DURATION: 74 MS
QT INTERVAL: 450 MS
QTC CALCULATION(BAZETT): 475 MS
QTC FREDERICIA: 467 MS
R AXIS: 68 DEGREES
T AXIS: 72 DEGREES
T OFFSET: 448 MS
VENTRICULAR RATE: 67 BPM

## 2025-03-10 NOTE — PROGRESS NOTES
Chief Complaint   Patient presents with    Pelvis - Follow-up     Closed displaced fracture of right acetabulum  DOI 2/3/25      Right Upper Arm - Follow-up     Humeral fx  DOI 2/3/25  Xray today     History of Present Illness:  3/11/25: Her shoulder continues to bother her. It does feel slightly better since her last visit. Her hip is doing well and making improvements.     2/18/25: Mana Avila is a 74 y.o. female presenting to clinic as a new patient  for her right shoulder and pelvis. She was seen in the ED at [place] on 2/3/25 after sustaining a mechanical fall on ice. She was found to have a right humerus fracture and right superior pubic ramus fracture. Overall she is doing well despite the pain. She is wearing her sling and using a walker.     Imaging:  X-rays right shoulder: Showed early callus and signs of healing of proximal humerus fracture, no interval change.  X-rays pelvis: Shows early callus and signs of healing of pubic rami fracture, no interval change.     Assessment:   Non-op management right proximal humerus  Non-op management pubic rami fracture    Plan:  We reviewed the role of imaging, physical therapy, injections and the time frame to healing and correlation with outcome.  Protected weightbearing as tolerated right lower extremity   She can begin pendulum exercises and protected weight bearing as tolerated starting next week.   Flexeril refilled for muscle spasm  Follow-up in 3 weeks with repeat 2 view x-rays of her shoulder.      Physical Exam:  Right Shoulder:  Strength / ROM: limited  Neurovascular exam normal distally       Review of Systems:  GENERAL: Negative for malaise, significant weight loss, fever  MUSCULOSKELETAL: See HPI  NEURO:  Negative     Past Medical History:   Diagnosis Date    Arthritis     Cancer (Multi)     Hypertension        Medication Documentation Review Audit       Reviewed by Julee Callejas (Technician) on 02/03/25 at 1329      Medication Order Taking? Sig  Documenting Provider Last Dose Status   CHOLECALCIFEROL, VITAMIN D3, ORAL [id number] No Take by mouth once daily. Historical Provider, MD 2025 Active   escitalopram (Lexapro) 10 mg tablet [id number] Yes Take 3 tablets (30 mg) by mouth once daily. Historical Provider, MD 2025 Active   ezetimibe (Zetia) 10 mg tablet [id number] Yes Take 1 tablet (10 mg) by mouth once daily. Historical Provider, MD 2025 Active   famotidine (Pepcid) 40 mg tablet [id number] Yes Take 1 tablet (40 mg) by mouth once daily at bedtime. Historical Provider, MD 2025 Bedtime Active   fluticasone (Flonase) 50 mcg/actuation nasal spray [id number] Yes 1 spray by Does not apply route once daily as needed for rhinitis or allergies. Historical Provider, MD Unknown Active   losartan (Cozaar) 50 mg tablet [id number] Yes Take 1 tablet (50 mg) by mouth once daily. Historical Provider, MD 2025 Active   NIFEdipine CC 30 mg 24 hr tablet [id number] Yes Take 1 tablet (30 mg) by mouth once daily. Historical Provider, MD 2025 Active   pantoprazole (ProtoNix) 40 mg EC tablet [id number] Yes Take 1 tablet (40 mg) by mouth twice a day. Historical Provider, MD 2025 Active                    No Known Allergies    Social History     Socioeconomic History    Marital status:      Spouse name: Not on file    Number of children: Not on file    Years of education: Not on file    Highest education level: Not on file   Occupational History    Not on file   Tobacco Use    Smoking status: Former     Current packs/day: 0.00     Types: Cigarettes     Quit date: 1975     Years since quittin.2    Smokeless tobacco: Never   Substance and Sexual Activity    Alcohol use: Not on file    Drug use: Not on file    Sexual activity: Not on file   Other Topics Concern    Not on file   Social History Narrative    Not on file     Social Drivers of Health     Financial Resource Strain: Low Risk  (2/3/2025)    Overall Financial Resource Strain (CARDIA)      Difficulty of Paying Living Expenses: Not very hard   Food Insecurity: No Food Insecurity (2/3/2025)    Hunger Vital Sign     Worried About Running Out of Food in the Last Year: Never true     Ran Out of Food in the Last Year: Never true   Transportation Needs: No Transportation Needs (2/3/2025)    PRAPARE - Transportation     Lack of Transportation (Medical): No     Lack of Transportation (Non-Medical): No   Physical Activity: Unknown (6/10/2024)    Received from [facility]    Exercise Vital Sign     Days of Exercise per Week: 2 days     Minutes of Exercise per Session: Not on file   Stress: No Stress Concern Present (3/21/2023)    Received from [facility]    [employer] - Occupational Stress Questionnaire     Feeling of Stress : Only a little   Social Connections: Moderately Isolated (6/10/2024)    Received from [facility]    Social Connection and Isolation Panel [NHANES]     Frequency of Communication with Friends and Family: More than three times a week     Frequency of Social Gatherings with Friends and Family: Twice a week     Attends Yarsani Services: Never     Active Member of Clubs or Organizations: No     Attends Club or Organization Meetings: Never     Marital Status:    Intimate Partner Violence: Not At Risk (2/3/2025)    Humiliation, Afraid, Rape, and Kick questionnaire     Fear of Current or Ex-Partner: No     Emotionally Abused: No     Physically Abused: No     Sexually Abused: No   Housing Stability: High Risk (2/3/2025)    Housing Stability Vital Sign     Unable to Pay for Housing in the Last Year: No     Number of Times Moved in the Last Year: 45     Homeless in the Last Year: No       Past Surgical History:   Procedure Laterality Date    APPENDECTOMY      FRACTURE SURGERY         XR wrist right 3+ views    Result Date: 2/5/2025  Interpreted By:  Schoenberger, Joseph, STUDY: XR WRIST RIGHT 3+ VIEWS; ;  2/5/2025 11:34 am   INDICATION:  Signs/Symptoms:R wrist pain after fall.     COMPARISON: None.   ACCESSION NUMBER(S): [id number]   ORDERING CLINICIAN: CONNIE COLEMAN   FINDINGS: There is no definite acute fracture or dislocation. There is mild chondrocalcinosis in the triangular fibrocartilage. No erosive arthropathy. Mild DJD 1st carpometacarpal articulation.       No definite acute findings. See discussion above.     MACRO: None   Signed by: Joseph Schoenberger 2/5/2025 11:57 AM Dictation workstation:   [id number]    CT hip right wo IV contrast    Addendum Date: 2/3/2025    Interpreted By:  Jolie Mei, ADDENDUM: On further imaging review, there is a tiny crescentic shaped calcific density along the inferior margin of the pubic symphysis at the midline suggestive of an acute-subacute minimally displaced avulsion fracture.   Signed by: Jolie Mei 2/3/2025 11:38 AM   -------- ORIGINAL REPORT -------- Dictation workstation:   [id number]    Result Date: 2/3/2025  Interpreted By:  Jolie Mei, STUDY: CT HIP RIGHT WO IV CONTRAST;  2/3/2025 11:22 am   INDICATION: Signs/Symptoms:R hip pain, fall.   COMPARISON: Same day radiographs.   ACCESSION NUMBER(S): [id number]   ORDERING CLINICIAN: FABIO BASHIR   TECHNIQUE: CT imaging of the right hip was obtained without administration of intravenous contrast medium. Coronal and sagittal reformatted images were performed.   FINDINGS: OSSEOUS STRUCTURES: There is a minimal minimally displaced fracture of the anterior acetabulum extending into the base of the superior pubic ramus. No additional fracture is identified. No dislocation. There is mild femoroacetabular joint space narrowing posteriorly. No significant hip joint effusion is visualized.   SOFT TISSUES: No subcutaneous or intramuscular hematoma. A small fat containing right inguinal hernia is noted. No acute abnormality is present in the imaged pelvis.       Minimally displaced fracture anterior acetabulum extending into base of superior  pubic ramus.   MACRO: None   Signed by: Jolie Mei 2/3/2025 11:33 AM Dictation workstation:   [id number]    XR shoulder right 2+ views    Result Date: 2/3/2025  Interpreted By:  Arden Michael, STUDY: XR CLAVICLE RIGHT; XR HUMERUS RIGHT; XR SHOULDER RIGHT 2+ VIEWS; 2/3/2025 9:57 am   INDICATION: Signs/Symptoms:pain, fall; Signs/Symptoms:R arm pain fall; Signs/Symptoms:R shoulder pain.   COMPARISON: None.   ACCESSION NUMBER(S): [id number]; [id number]; [id number]   ORDERING CLINICIAN: FABIO BASHIR   FINDINGS: Bony structures:  Slightly impacted fracture at the humeral neck, and suspected nondisplaced fracture of the greater tuberosity. The remaining bony structures appear intact.   Joint spaces:  Maintained   Soft tissues:  Unremarkable without significant edema or radiodense foreign body   Other:  None significant       Humeral fracture.   MACRO: None   Signed by: Arden Michael 2/3/2025 10:15 AM Dictation workstation:   [id number]    XR humerus right    Result Date: 2/3/2025  Interpreted By:  Arden Michael, STUDY: XR CLAVICLE RIGHT; XR HUMERUS RIGHT; XR SHOULDER RIGHT 2+ VIEWS; 2/3/2025 9:57 am   INDICATION: Signs/Symptoms:pain, fall; Signs/Symptoms:R arm pain fall; Signs/Symptoms:R shoulder pain.   COMPARISON: None.   ACCESSION NUMBER(S): [id number]; [id number]; [id number]   ORDERING CLINICIAN: FABIO BASHIR   FINDINGS: Bony structures:  Slightly impacted fracture at the humeral neck, and suspected nondisplaced fracture of the greater tuberosity. The remaining bony structures appear intact.   Joint spaces:  Maintained   Soft tissues:  Unremarkable without significant edema or radiodense foreign body   Other:  None significant       Humeral fracture.   MACRO: None   Signed by: Arden Michael 2/3/2025 10:15 AM Dictation workstation:   [id number]    XR clavicle right    Result Date: 2/3/2025  Interpreted By:  Arden Michael, STUDY: XR CLAVICLE RIGHT; XR HUMERUS RIGHT; XR SHOULDER RIGHT 2+ VIEWS; 2/3/2025  9:57 am   INDICATION: Signs/Symptoms:pain, fall; Signs/Symptoms:R arm pain fall; Signs/Symptoms:R shoulder pain.   COMPARISON: None.   ACCESSION NUMBER(S): [id number]; [id number]; [id number]   ORDERING CLINICIAN: FABIO BASHIR   FINDINGS: Bony structures:  Slightly impacted fracture at the humeral neck, and suspected nondisplaced fracture of the greater tuberosity. The remaining bony structures appear intact.   Joint spaces:  Maintained   Soft tissues:  Unremarkable without significant edema or radiodense foreign body   Other:  None significant       Humeral fracture.   MACRO: None   Signed by: Arden Michael 2/3/2025 10:15 AM Dictation workstation:   [id number]    XR chest 1 view    Result Date: 2/3/2025  Interpreted By:  Arden Michael, STUDY: XR CHEST 1 VIEW;  2/3/2025 9:57 am   INDICATION: Signs/Symptoms:fall.   COMPARISON: None.   ACCESSION NUMBER(S): [id number]   ORDERING CLINICIAN: FABIO BASHIR   FINDINGS: CARDIOMEDIASTINAL SILHOUETTE AND VASCULATURE:   Cardiac size:  Within normal limits. Aortic shadow:  Within normal limits.   Mediastinal contours: Within normal limits.   Pulmonary vasculature:  The central vasculature is unremarkable   LUNGS: Several linear opacities at the left lower lung medially may be due to atelectasis and/or fibrosis. The lungs otherwise are clear.   ABDOMEN AND OTHER FINDINGS: No remarkable upper abdominal findings.   BONES: Slightly impacted fracture of the right humeral neck is noted.       1.  Left lower lung atelectasis or fibrosis. 2. Right humeral fracture.   Signed by: Arden Michael 2/3/2025 10:12 AM Dictation workstation:   [id number]    XR hip right with pelvis when performed 2 or 3 views    Result Date: 2/3/2025  Interpreted By:  Arden Michael, STUDY: XR HIP RIGHT WITH PELVIS WHEN PERFORMED 2 OR 3 VIEWS;  2/3/2025 9:57 am   INDICATION: Signs/Symptoms:R hip pain, fall.   COMPARISON: None.   ACCESSION NUMBER(S): [id number]   ORDERING CLINICIAN: FABIO BASHIR    FINDINGS: Bony structures:  Intact   Joint spaces:  Maintained   Soft tissues:  Unremarkable without significant edema or radiodense foreign body   Other:  None significant       No acute findings.   MACRO: None   Signed by: Arden Michael 2/3/2025 10:11 AM Dictation workstation:   [id number]    CT head wo IV contrast    Result Date: 2/3/2025  Interpreted By:  Israel Cruz, STUDY: CT HEAD WO IV CONTRAST; CT CERVICAL SPINE WO IV CONTRAST;  2/3/2025 9:28 am   INDICATION: Signs/Symptoms:fall head injury; Signs/Symptoms:fall.     COMPARISON: None   ACCESSION NUMBER(S): [id number]; [id number]   ORDERING CLINICIAN: FABIO BASHIR   TECHNIQUE: CT of the brain from the skull vertex to the skull base, without intravenous contrast   CT cervical spine from the craniocervical junction through the cervicothoracic junction without IV contrast, including sagittal and coronal reformatted images   FINDINGS: CT BRAIN   TRAUMA-RELATED   Brain Injury (BIG) guidelines CT values:   Skull fracture: No SDH (subdural hematoma): None detected EDH (epidural hematoma): None detected IPH (intraparenchymal hemorrhage): None detected SAH (subarachnoid hemorrhage): None detected IVH (intraventricular hemorrhage): None detected   Reference: Jono RICHARDS, Pérez RS, Sea M, et al. The BIG (brain injury guidelines) project: defining the management of traumatic brain injury by acute care surgeons. J Trauma Acute Care Surg. 2014;76:910h124.   OTHER   ACUTE INTRACRANIAL MASS EFFECT:  Negative   CT EVIDENCE OF ACUTE / SUBACUTE TERRITORIAL ISCHEMIA:  Negative   VENTRICLES:  Normal caliber and configuration   OTHER BRAIN FINDINGS:  No additional findings to note   INCLUDED PARANASAL SINUSES: All clear   INCLUDED MASTOID AIR CELLS: All clear   SKULL:  No lytic or blastic lesion   EXTRACRANIAL SOFT TISSUES:  Scalp and occular globes grossly normal by CT   -------   CT CERVICAL SPINE   COUNTING REFERENCE: Craniocervical junction   CRANIOCERVICAL JUNCTION:   Intact   CERVICAL ALIGNMENT:  Rightward curvature is probably positional. No jumped facets are other acute traumatic alignment abnormality   ACUTE FRACTURE: Negative   AGGRESSIVE OSSEOUS LESION: Negative   BONY CANAL AND FORAMINA:  Disc osteophyte complexes at C3-4 and C5-6 contribute to mild and moderate bony foraminal and bony canal stenoses   PARASPINAL SOFT TISSUES:  No large acute hematoma or other acute posttraumatic finding   OTHER INCLUDED STRUCTURES:  No acute or contributory soft tissue abnormality in the other cervical and upper thoracic soft tissues       NO ACUTE INTRACRANIAL PROCESS. SKULL INTACT   NO ACUTE FRACTURE OR SUBLUXATION IN THE CERVICAL SPINE   THIS REPORT SERVES AS THE DIAGNOSTIC INTERPRETATION FOR TWO EXAMS PERFORMED CONCURRENTLY: CT BRAIN WITHOUT IV CONTRAST AND CT CERVICAL SPINE WITHOUT IV CONTRAST   MACRO: None   Signed by: Israel Cruz 2/3/2025 9:46 AM Dictation workstation:   [id number]    CT cervical spine wo IV contrast    Result Date: 2/3/2025  Interpreted By:  Israel Cruz, STUDY: CT HEAD WO IV CONTRAST; CT CERVICAL SPINE WO IV CONTRAST;  2/3/2025 9:28 am   INDICATION: Signs/Symptoms:fall head injury; Signs/Symptoms:fall.     COMPARISON: None   ACCESSION NUMBER(S): [id number]; [id number]   ORDERING CLINICIAN: FABIO BASHIR   TECHNIQUE: CT of the brain from the skull vertex to the skull base, without intravenous contrast   CT cervical spine from the craniocervical junction through the cervicothoracic junction without IV contrast, including sagittal and coronal reformatted images   FINDINGS: CT BRAIN   TRAUMA-RELATED   Brain Injury (BIG) guidelines CT values:   Skull fracture: No SDH (subdural hematoma): None detected EDH (epidural hematoma): None detected IPH (intraparenchymal hemorrhage): None detected SAH (subarachnoid hemorrhage): None detected IVH (intraventricular hemorrhage): None detected   Reference: Jono RICHARDS, Pérez RS, Sea M, et al. The BIG (brain injury  guidelines) project: defining the management of traumatic brain injury by acute care surgeons. J Trauma Acute Care Surg. 2014;76:832s354.   OTHER   ACUTE INTRACRANIAL MASS EFFECT:  Negative   CT EVIDENCE OF ACUTE / SUBACUTE TERRITORIAL ISCHEMIA:  Negative   VENTRICLES:  Normal caliber and configuration   OTHER BRAIN FINDINGS:  No additional findings to note   INCLUDED PARANASAL SINUSES: All clear   INCLUDED MASTOID AIR CELLS: All clear   SKULL:  No lytic or blastic lesion   EXTRACRANIAL SOFT TISSUES:  Scalp and occular globes grossly normal by CT   -------   CT CERVICAL SPINE   COUNTING REFERENCE: Craniocervical junction   CRANIOCERVICAL JUNCTION:  Intact   CERVICAL ALIGNMENT:  Rightward curvature is probably positional. No jumped facets are other acute traumatic alignment abnormality   ACUTE FRACTURE: Negative   AGGRESSIVE OSSEOUS LESION: Negative   BONY CANAL AND FORAMINA:  Disc osteophyte complexes at C3-4 and C5-6 contribute to mild and moderate bony foraminal and bony canal stenoses   PARASPINAL SOFT TISSUES:  No large acute hematoma or other acute posttraumatic finding   OTHER INCLUDED STRUCTURES:  No acute or contributory soft tissue abnormality in the other cervical and upper thoracic soft tissues       NO ACUTE INTRACRANIAL PROCESS. SKULL INTACT   NO ACUTE FRACTURE OR SUBLUXATION IN THE CERVICAL SPINE   THIS REPORT SERVES AS THE DIAGNOSTIC INTERPRETATION FOR TWO EXAMS PERFORMED CONCURRENTLY: CT BRAIN WITHOUT IV CONTRAST AND CT CERVICAL SPINE WITHOUT IV CONTRAST   MACRO: None   Signed by: Israel Cruz 2/3/2025 9:46 AM Dictation workstation:   [id number]    ECG 12 lead    Result Date: 2/3/2025  Normal sinus rhythm Nonspecific ST abnormality Abnormal ECG When compared with ECG of 03-FEB-2025 09:04, (unconfirmed) No significant change was found         Scribe Attestation  By signing my name below, Sangeeta REHMAN, Scribe   attest that this documentation has been prepared under the direction and in the presence  of Diego Camacho MD.

## 2025-03-11 ENCOUNTER — OFFICE VISIT (OUTPATIENT)
Dept: ORTHOPEDIC SURGERY | Facility: CLINIC | Age: 75
End: 2025-03-11
Payer: MEDICARE

## 2025-03-11 ENCOUNTER — HOSPITAL ENCOUNTER (OUTPATIENT)
Dept: RADIOLOGY | Facility: CLINIC | Age: 75
Discharge: HOME | End: 2025-03-11
Payer: MEDICARE

## 2025-03-11 DIAGNOSIS — R10.2 PAIN IN FEMALE PELVIS: ICD-10-CM

## 2025-03-11 DIAGNOSIS — M25.511 RIGHT SHOULDER PAIN, UNSPECIFIED CHRONICITY: ICD-10-CM

## 2025-03-11 PROCEDURE — 73030 X-RAY EXAM OF SHOULDER: CPT | Mod: RT

## 2025-03-11 PROCEDURE — 73030 X-RAY EXAM OF SHOULDER: CPT | Mod: RIGHT SIDE | Performed by: RADIOLOGY

## 2025-03-11 PROCEDURE — 99211 OFF/OP EST MAY X REQ PHY/QHP: CPT | Performed by: ORTHOPAEDIC SURGERY

## 2025-03-11 PROCEDURE — 73502 X-RAY EXAM HIP UNI 2-3 VIEWS: CPT | Mod: RT

## 2025-03-11 RX ORDER — CYCLOBENZAPRINE HCL 10 MG
10 TABLET ORAL 3 TIMES DAILY PRN
Qty: 15 TABLET | Refills: 0 | Status: SHIPPED | OUTPATIENT
Start: 2025-03-11 | End: 2025-03-21

## 2025-04-02 NOTE — PROGRESS NOTES
Chief Complaint   Patient presents with    Right Shoulder - Follow-up     Humeral fx 2/3/2025, xrays today     History of Present Illness:  4/4/25: Her shoulder is starting to improve and is doing well.     3/11/25: Her shoulder continues to bother her. It does feel slightly better since her last visit. Her hip is doing well and making improvements.     2/18/25: Mana Avila is a 74 y.o. female presenting to clinic as a new patient  for her right shoulder and pelvis. She was seen in the ED at [place] on 2/3/25 after sustaining a mechanical fall on ice. She was found to have a right humerus fracture and right superior pubic ramus fracture. Overall she is doing well despite the pain. She is wearing her sling and using a walker.     Imaging:  X-rays right shoulder: Showed early callus and signs of healing of proximal humerus fracture, no interval change.     Assessment:   Non-op management right proximal humerus    Plan:  We reviewed the role of imaging, physical therapy, injections and the time frame to healing and correlation with outcome.  Full passive motion and stretching  Follow-up in 4-6 weeks with repeat 2 view x-rays of her shoulder.      Physical Exam:  Right Shoulder:  Strength / ROM: External rotation to 20  Neurovascular exam normal distally       Review of Systems:  GENERAL: Negative for malaise, significant weight loss, fever  MUSCULOSKELETAL: See HPI  NEURO:  Negative     Past Medical History:   Diagnosis Date    Arthritis     Cancer (Multi)     Hypertension        Medication Documentation Review Audit       Reviewed by Julee Callejas (Technician) on 02/03/25 at 1329      Medication Order Taking? Sig Documenting Provider Last Dose Status   CHOLECALCIFEROL, VITAMIN D3, ORAL [id number] No Take by mouth once daily. Historical Provider, MD 2/1/2025 Active   escitalopram (Lexapro) 10 mg tablet [id number] Yes Take 3 tablets (30 mg) by mouth once daily. Historical Provider, MD 2/1/2025 Active   ezetimibe  (Zetia) 10 mg tablet [id number] Yes Take 1 tablet (10 mg) by mouth once daily. Historical Provider, MD 2025 Active   famotidine (Pepcid) 40 mg tablet [id number] Yes Take 1 tablet (40 mg) by mouth once daily at bedtime. Historical Provider, MD 2025 Bedtime Active   fluticasone (Flonase) 50 mcg/actuation nasal spray [id number] Yes 1 spray by Does not apply route once daily as needed for rhinitis or allergies. Historical Provider, MD Unknown Active   losartan (Cozaar) 50 mg tablet [id number] Yes Take 1 tablet (50 mg) by mouth once daily. Historical Provider, MD 2025 Active   NIFEdipine CC 30 mg 24 hr tablet [id number] Yes Take 1 tablet (30 mg) by mouth once daily. Historical Provider, MD 2025 Active   pantoprazole (ProtoNix) 40 mg EC tablet [id number] Yes Take 1 tablet (40 mg) by mouth twice a day. Historical Provider, MD 2025 Active                    No Known Allergies    Social History     Socioeconomic History    Marital status:      Spouse name: Not on file    Number of children: Not on file    Years of education: Not on file    Highest education level: Not on file   Occupational History    Not on file   Tobacco Use    Smoking status: Former     Current packs/day: 0.00     Types: Cigarettes     Quit date: 1975     Years since quittin.2    Smokeless tobacco: Never   Substance and Sexual Activity    Alcohol use: Not on file    Drug use: Not on file    Sexual activity: Not on file   Other Topics Concern    Not on file   Social History Narrative    Not on file     Social Drivers of Health     Financial Resource Strain: Low Risk  (2/3/2025)    Overall Financial Resource Strain (CARDIA)     Difficulty of Paying Living Expenses: Not very hard   Food Insecurity: No Food Insecurity (2/3/2025)    Hunger Vital Sign     Worried About Running Out of Food in the Last Year: Never true     Ran Out of Food in the Last Year: Never true   Transportation Needs: No Transportation Needs (2/3/2025)     PRAPARE - Transportation     Lack of Transportation (Medical): No     Lack of Transportation (Non-Medical): No   Physical Activity: Unknown (6/10/2024)    Received from [facility]    Exercise Vital Sign     Days of Exercise per Week: 2 days     Minutes of Exercise per Session: Not on file   Stress: No Stress Concern Present (3/21/2023)    Received from [facility]    [employer] [place] - Occupational Stress Questionnaire     Feeling of Stress : Only a little   Social Connections: Moderately Isolated (6/10/2024)    Received from [facility]    Social Connection and Isolation Panel [NHANES]     Frequency of Communication with Friends and Family: More than three times a week     Frequency of Social Gatherings with Friends and Family: Twice a week     Attends Pentec[employer]al Services: Never     Active Member of Clubs or Organizations: No     Attends Club or Organization Meetings: Never     Marital Status:    Intimate Partner Violence: Not At Risk (2/3/2025)    Humiliation, Afraid, Rape, and Kick questionnaire     Fear of Current or Ex-Partner: No     Emotionally Abused: No     Physically Abused: No     Sexually Abused: No   Housing Stability: High Risk (2/3/2025)    Housing Stability Vital Sign     Unable to Pay for Housing in the Last Year: No     Number of Times Moved in the Last Year: 45     Homeless in the Last Year: No       Past Surgical History:   Procedure Laterality Date    APPENDECTOMY      FRACTURE SURGERY         XR wrist right 3+ views    Result Date: 2/5/2025  Interpreted By:  Schoenberger, Joseph, STUDY: XR WRIST RIGHT 3+ VIEWS; ;  2/5/2025 11:34 am   INDICATION: Signs/Symptoms:R wrist pain after fall.     COMPARISON: None.   ACCESSION NUMBER(S): [id number]   ORDERING CLINICIAN: CONNIE COLEMAN   FINDINGS: There is no definite acute fracture or dislocation. There is mild chondrocalcinosis in the triangular fibrocartilage. No erosive arthropathy. Mild DJD 1st  carpometacarpal articulation.       No definite acute findings. See discussion above.     MACRO: None   Signed by: Joseph Schoenberger 2/5/2025 11:57 AM Dictation workstation:   [id number]    CT hip right wo IV contrast    Addendum Date: 2/3/2025    Interpreted By:  Jolie Mei, ADDENDUM: On further imaging review, there is a tiny crescentic shaped calcific density along the inferior margin of the pubic symphysis at the midline suggestive of an acute-subacute minimally displaced avulsion fracture.   Signed by: Jolie Mei 2/3/2025 11:38 AM   -------- ORIGINAL REPORT -------- Dictation workstation:   [id number]    Result Date: 2/3/2025  Interpreted By:  Jolie Mei, STUDY: CT HIP RIGHT WO IV CONTRAST;  2/3/2025 11:22 am   INDICATION: Signs/Symptoms:R hip pain, fall.   COMPARISON: Same day radiographs.   ACCESSION NUMBER(S): [id number]   ORDERING CLINICIAN: FABIO BASHIR   TECHNIQUE: CT imaging of the right hip was obtained without administration of intravenous contrast medium. Coronal and sagittal reformatted images were performed.   FINDINGS: OSSEOUS STRUCTURES: There is a minimal minimally displaced fracture of the anterior acetabulum extending into the base of the superior pubic ramus. No additional fracture is identified. No dislocation. There is mild femoroacetabular joint space narrowing posteriorly. No significant hip joint effusion is visualized.   SOFT TISSUES: No subcutaneous or intramuscular hematoma. A small fat containing right inguinal hernia is noted. No acute abnormality is present in the imaged pelvis.       Minimally displaced fracture anterior acetabulum extending into base of superior pubic ramus.   MACRO: None   Signed by: Jolie Mei 2/3/2025 11:33 AM Dictation workstation:   [id number]    XR shoulder right 2+ views    Result Date: 2/3/2025  Interpreted By:  Arden Michael, STUDY: XR CLAVICLE RIGHT; XR HUMERUS RIGHT; XR SHOULDER RIGHT 2+ VIEWS; 2/3/2025 9:57 am   INDICATION:  Signs/Symptoms:pain, fall; Signs/Symptoms:R arm pain fall; Signs/Symptoms:R shoulder pain.   COMPARISON: None.   ACCESSION NUMBER(S): [id number]; [id number]; [id number]   ORDERING CLINICIAN: FABIO BASHIR   FINDINGS: Bony structures:  Slightly impacted fracture at the humeral neck, and suspected nondisplaced fracture of the greater tuberosity. The remaining bony structures appear intact.   Joint spaces:  Maintained   Soft tissues:  Unremarkable without significant edema or radiodense foreign body   Other:  None significant       Humeral fracture.   MACRO: None   Signed by: Arden Michael 2/3/2025 10:15 AM Dictation workstation:   [id number]    XR humerus right    Result Date: 2/3/2025  Interpreted By:  Arden Michael, STUDY: XR CLAVICLE RIGHT; XR HUMERUS RIGHT; XR SHOULDER RIGHT 2+ VIEWS; 2/3/2025 9:57 am   INDICATION: Signs/Symptoms:pain, fall; Signs/Symptoms:R arm pain fall; Signs/Symptoms:R shoulder pain.   COMPARISON: None.   ACCESSION NUMBER(S): [id number]; [id number]; [id number]   ORDERING CLINICIAN: FABIO BASHIR   FINDINGS: Bony structures:  Slightly impacted fracture at the humeral neck, and suspected nondisplaced fracture of the greater tuberosity. The remaining bony structures appear intact.   Joint spaces:  Maintained   Soft tissues:  Unremarkable without significant edema or radiodense foreign body   Other:  None significant       Humeral fracture.   MACRO: None   Signed by: Arden Michael 2/3/2025 10:15 AM Dictation workstation:   [id number]    XR clavicle right    Result Date: 2/3/2025  Interpreted By:  Arden Michael, STUDY: XR CLAVICLE RIGHT; XR HUMERUS RIGHT; XR SHOULDER RIGHT 2+ VIEWS; 2/3/2025 9:57 am   INDICATION: Signs/Symptoms:pain, fall; Signs/Symptoms:R arm pain fall; Signs/Symptoms:R shoulder pain.   COMPARISON: None.   ACCESSION NUMBER(S): [id number]; [id number]; [id number]   ORDERING CLINICIAN: FABIO BASHIR   FINDINGS: Bony structures:  Slightly impacted fracture at the  humeral neck, and suspected nondisplaced fracture of the greater tuberosity. The remaining bony structures appear intact.   Joint spaces:  Maintained   Soft tissues:  Unremarkable without significant edema or radiodense foreign body   Other:  None significant       Humeral fracture.   MACRO: None   Signed by: Arden Michael 2/3/2025 10:15 AM Dictation workstation:   [id number]    XR chest 1 view    Result Date: 2/3/2025  Interpreted By:  Arden Michael, STUDY: XR CHEST 1 VIEW;  2/3/2025 9:57 am   INDICATION: Signs/Symptoms:fall.   COMPARISON: None.   ACCESSION NUMBER(S): [id number]   ORDERING CLINICIAN: FABIO BASHIR   FINDINGS: CARDIOMEDIASTINAL SILHOUETTE AND VASCULATURE:   Cardiac size:  Within normal limits. Aortic shadow:  Within normal limits.   Mediastinal contours: Within normal limits.   Pulmonary vasculature:  The central vasculature is unremarkable   LUNGS: Several linear opacities at the left lower lung medially may be due to atelectasis and/or fibrosis. The lungs otherwise are clear.   ABDOMEN AND OTHER FINDINGS: No remarkable upper abdominal findings.   BONES: Slightly impacted fracture of the right humeral neck is noted.       1.  Left lower lung atelectasis or fibrosis. 2. Right humeral fracture.   Signed by: Arden Michael 2/3/2025 10:12 AM Dictation workstation:   [id number]    XR hip right with pelvis when performed 2 or 3 views    Result Date: 2/3/2025  Interpreted By:  Arden Michael, STUDY: XR HIP RIGHT WITH PELVIS WHEN PERFORMED 2 OR 3 VIEWS;  2/3/2025 9:57 am   INDICATION: Signs/Symptoms:R hip pain, fall.   COMPARISON: None.   ACCESSION NUMBER(S): [id number]   ORDERING CLINICIAN: FABIO BASHIR   FINDINGS: Bony structures:  Intact   Joint spaces:  Maintained   Soft tissues:  Unremarkable without significant edema or radiodense foreign body   Other:  None significant       No acute findings.   MACRO: None   Signed by: Arden Michael 2/3/2025 10:11 AM Dictation workstation:   [id number]    CT head  wo IV contrast    Result Date: 2/3/2025  Interpreted By:  Israel Cruz, STUDY: CT HEAD WO IV CONTRAST; CT CERVICAL SPINE WO IV CONTRAST;  2/3/2025 9:28 am   INDICATION: Signs/Symptoms:fall head injury; Signs/Symptoms:fall.     COMPARISON: None   ACCESSION NUMBER(S): [id number]; [id number]   ORDERING CLINICIAN: FABIO BASHIR   TECHNIQUE: CT of the brain from the skull vertex to the skull base, without intravenous contrast   CT cervical spine from the craniocervical junction through the cervicothoracic junction without IV contrast, including sagittal and coronal reformatted images   FINDINGS: CT BRAIN   TRAUMA-RELATED   Brain Injury (BIG) guidelines CT values:   Skull fracture: No SDH (subdural hematoma): None detected EDH (epidural hematoma): None detected IPH (intraparenchymal hemorrhage): None detected SAH (subarachnoid hemorrhage): None detected IVH (intraventricular hemorrhage): None detected   Reference: Jono RICHARDS, Pérez RS, Sea M, et al. The BIG (brain injury guidelines) project: defining the management of traumatic brain injury by acute care surgeons. J Trauma Acute Care Surg. 2014;76:523x704.   OTHER   ACUTE INTRACRANIAL MASS EFFECT:  Negative   CT EVIDENCE OF ACUTE / SUBACUTE TERRITORIAL ISCHEMIA:  Negative   VENTRICLES:  Normal caliber and configuration   OTHER BRAIN FINDINGS:  No additional findings to note   INCLUDED PARANASAL SINUSES: All clear   INCLUDED MASTOID AIR CELLS: All clear   SKULL:  No lytic or blastic lesion   EXTRACRANIAL SOFT TISSUES:  Scalp and occular globes grossly normal by CT   -------   CT CERVICAL SPINE   COUNTING REFERENCE: Craniocervical junction   CRANIOCERVICAL JUNCTION:  Intact   CERVICAL ALIGNMENT:  Rightward curvature is probably positional. No jumped facets are other acute traumatic alignment abnormality   ACUTE FRACTURE: Negative   AGGRESSIVE OSSEOUS LESION: Negative   BONY CANAL AND FORAMINA:  Disc osteophyte complexes at C3-4 and C5-6 contribute to mild and moderate  bony foraminal and bony canal stenoses   PARASPINAL SOFT TISSUES:  No large acute hematoma or other acute posttraumatic finding   OTHER INCLUDED STRUCTURES:  No acute or contributory soft tissue abnormality in the other cervical and upper thoracic soft tissues       NO ACUTE INTRACRANIAL PROCESS. SKULL INTACT   NO ACUTE FRACTURE OR SUBLUXATION IN THE CERVICAL SPINE   THIS REPORT SERVES AS THE DIAGNOSTIC INTERPRETATION FOR TWO EXAMS PERFORMED CONCURRENTLY: CT BRAIN WITHOUT IV CONTRAST AND CT CERVICAL SPINE WITHOUT IV CONTRAST   MACRO: None   Signed by: Israel Cruz 2/3/2025 9:46 AM Dictation workstation:   [id number]    CT cervical spine wo IV contrast    Result Date: 2/3/2025  Interpreted By:  Israel Cruz, STUDY: CT HEAD WO IV CONTRAST; CT CERVICAL SPINE WO IV CONTRAST;  2/3/2025 9:28 am   INDICATION: Signs/Symptoms:fall head injury; Signs/Symptoms:fall.     COMPARISON: None   ACCESSION NUMBER(S): [id number]; [id number]   ORDERING CLINICIAN: FABIO BASHIR   TECHNIQUE: CT of the brain from the skull vertex to the skull base, without intravenous contrast   CT cervical spine from the craniocervical junction through the cervicothoracic junction without IV contrast, including sagittal and coronal reformatted images   FINDINGS: CT BRAIN   TRAUMA-RELATED   Brain Injury (BIG) guidelines CT values:   Skull fracture: No SDH (subdural hematoma): None detected EDH (epidural hematoma): None detected IPH (intraparenchymal hemorrhage): None detected SAH (subarachnoid hemorrhage): None detected IVH (intraventricular hemorrhage): None detected   Reference: Jono RICHARDS, Pérez RS, Sea M, et al. The BIG (brain injury guidelines) project: defining the management of traumatic brain injury by acute care surgeons. J Trauma Acute Care Surg. 2014;76:043p944.   OTHER   ACUTE INTRACRANIAL MASS EFFECT:  Negative   CT EVIDENCE OF ACUTE / SUBACUTE TERRITORIAL ISCHEMIA:  Negative   VENTRICLES:  Normal caliber and configuration   OTHER BRAIN  FINDINGS:  No additional findings to note   INCLUDED PARANASAL SINUSES: All clear   INCLUDED MASTOID AIR CELLS: All clear   SKULL:  No lytic or blastic lesion   EXTRACRANIAL SOFT TISSUES:  Scalp and occular globes grossly normal by CT   -------   CT CERVICAL SPINE   COUNTING REFERENCE: Craniocervical junction   CRANIOCERVICAL JUNCTION:  Intact   CERVICAL ALIGNMENT:  Rightward curvature is probably positional. No jumped facets are other acute traumatic alignment abnormality   ACUTE FRACTURE: Negative   AGGRESSIVE OSSEOUS LESION: Negative   BONY CANAL AND FORAMINA:  Disc osteophyte complexes at C3-4 and C5-6 contribute to mild and moderate bony foraminal and bony canal stenoses   PARASPINAL SOFT TISSUES:  No large acute hematoma or other acute posttraumatic finding   OTHER INCLUDED STRUCTURES:  No acute or contributory soft tissue abnormality in the other cervical and upper thoracic soft tissues       NO ACUTE INTRACRANIAL PROCESS. SKULL INTACT   NO ACUTE FRACTURE OR SUBLUXATION IN THE CERVICAL SPINE   THIS REPORT SERVES AS THE DIAGNOSTIC INTERPRETATION FOR TWO EXAMS PERFORMED CONCURRENTLY: CT BRAIN WITHOUT IV CONTRAST AND CT CERVICAL SPINE WITHOUT IV CONTRAST   MACRO: None   Signed by: Israel Cruz 2/3/2025 9:46 AM Dictation workstation:   [id number]    ECG 12 lead    Result Date: 2/3/2025  Normal sinus rhythm Nonspecific ST abnormality Abnormal ECG When compared with ECG of 03-FEB-2025 09:04, (unconfirmed) No significant change was found         Scribe Attestation  By signing my name below, [email or web address], Scribe   attest that this documentation has been prepared under the direction and in the presence of Diego Camacho MD.

## 2025-04-04 ENCOUNTER — HOSPITAL ENCOUNTER (OUTPATIENT)
Dept: RADIOLOGY | Facility: CLINIC | Age: 75
Discharge: HOME | End: 2025-04-04
Payer: MEDICARE

## 2025-04-04 ENCOUNTER — OFFICE VISIT (OUTPATIENT)
Dept: ORTHOPEDIC SURGERY | Facility: CLINIC | Age: 75
End: 2025-04-04
Payer: MEDICARE

## 2025-04-04 DIAGNOSIS — M25.511 RIGHT SHOULDER PAIN, UNSPECIFIED CHRONICITY: ICD-10-CM

## 2025-04-04 PROCEDURE — 73030 X-RAY EXAM OF SHOULDER: CPT | Mod: RT

## 2025-04-04 PROCEDURE — 99211 OFF/OP EST MAY X REQ PHY/QHP: CPT | Performed by: ORTHOPAEDIC SURGERY

## 2025-05-08 NOTE — PROGRESS NOTES
Chief Complaint   Patient presents with    Right Shoulder - Follow-up     Humeral fx 2/3/2025, xrays today     History of Present Illness:  5/9/25: Her shoulder is doing well with no complaints. She did have a small fall recently but feels that her shoulder is doing well.     4/4/25: Her shoulder is starting to improve and is doing well.     3/11/25: Her shoulder continues to bother her. It does feel slightly better since her last visit. Her hip is doing well and making improvements.     2/18/25: Mana Avila is a 75 y.o. female presenting to clinic as a new patient  for her right shoulder and pelvis. She was seen in the ED at [facility] on 2/3/25 after sustaining a mechanical fall on ice. She was found to have a right humerus fracture and right superior pubic ramus fracture. Overall she is doing well despite the pain. She is wearing her sling and using a walker.     Imaging:  X-rays right shoulder: Showed completely healed proximal humerus fracture.     Assessment:   Non-op management right proximal humerus    Plan:  We reviewed the role of imaging, physical therapy, injections and the time frame to healing and correlation with outcome.  She can return to full activities  Continue ice and ibuprofen  Follow-up as needed.       Physical Exam:  Right Shoulder:  Strength / ROM: External rotation to 30, abduction to 110  Neurovascular exam normal distally       Review of Systems:  GENERAL: Negative for malaise, significant weight loss, fever  MUSCULOSKELETAL: See HPI  NEURO:  Negative     Past Medical History:   Diagnosis Date    Arthritis     Cancer (Multi)     Hypertension        Medication Documentation Review Audit       Reviewed by Julee Callejas (Technician) on 02/03/25 at 1329      Medication Order Taking? Sig Documenting Provider Last Dose Status   CHOLECALCIFEROL, VITAMIN D3, ORAL [id number] No Take by mouth once daily. Historical Provider, MD 2/1/2025 Active   escitalopram (Lexapro) 10 mg tablet [id number] Yes  Take 3 tablets (30 mg) by mouth once daily. Historical Provider, MD 2025 Active   ezetimibe (Zetia) 10 mg tablet [id number] Yes Take 1 tablet (10 mg) by mouth once daily. Historical Provider, MD 2025 Active   famotidine (Pepcid) 40 mg tablet [id number] Yes Take 1 tablet (40 mg) by mouth once daily at bedtime. Shan Provider, MD 2025 Bedtime Active   fluticasone (Flonase) 50 mcg/actuation nasal spray [id number] Yes 1 spray by Does not apply route once daily as needed for rhinitis or allergies. Historical Provider, MD Unknown Active   losartan (Cozaar) 50 mg tablet [id number] Yes Take 1 tablet (50 mg) by mouth once daily. Historical Provider, MD 2025 Active   NIFEdipine CC 30 mg 24 hr tablet [id number] Yes Take 1 tablet (30 mg) by mouth once daily. Historical Provider, MD 2025 Active   pantoprazole (ProtoNix) 40 mg EC tablet [id number] Yes Take 1 tablet (40 mg) by mouth twice a day. Historical Provider, MD 2025 Active                    No Known Allergies    Social History     Socioeconomic History    Marital status:      Spouse name: Not on file    Number of children: Not on file    Years of education: Not on file    Highest education level: Not on file   Occupational History    Not on file   Tobacco Use    Smoking status: Former     Current packs/day: 0.00     Types: Cigarettes     Quit date: 1975     Years since quittin.3    Smokeless tobacco: Never   Substance and Sexual Activity    Alcohol use: Not on file    Drug use: Not on file    Sexual activity: Not on file   Other Topics Concern    Not on file   Social History Narrative    Not on file     Social Drivers of Health     Financial Resource Strain: Low Risk  (2/3/2025)    Overall Financial Resource Strain (CARDIA)     Difficulty of Paying Living Expenses: Not very hard   Food Insecurity: No Food Insecurity (2/3/2025)    Hunger Vital Sign     Worried About Running Out of Food in the Last Year: Never true     Ran Out of Food  in the Last Year: Never true   Transportation Needs: No Transportation Needs (2/3/2025)    PRAPARE - Transportation     Lack of Transportation (Medical): No     Lack of Transportation (Non-Medical): No   Physical Activity: Unknown (6/10/2024)    Received from [facility]    Exercise Vital Sign     Days of Exercise per Week: 2 days     Minutes of Exercise per Session: Not on file   Stress: No Stress Concern Present (3/21/2023)    Received from [facility]    [place] [employer] - Occupational Stress Questionnaire     Feeling of Stress : Only a little   Social Connections: Moderately Isolated (6/10/2024)    Received from [facility]    Social Connection and Isolation Panel [NHANES]     Frequency of Communication with Friends and Family: More than three times a week     Frequency of Social Gatherings with Friends and Family: Twice a week     Attends L[employer] day Services: Never     Active Member of Clubs or Organizations: No     Attends Club or Organization Meetings: Never     Marital Status:    Intimate Partner Violence: Not At Risk (2/3/2025)    Humiliation, Afraid, Rape, and Kick questionnaire     Fear of Current or Ex-Partner: No     Emotionally Abused: No     Physically Abused: No     Sexually Abused: No   Housing Stability: High Risk (2/3/2025)    Housing Stability Vital Sign     Unable to Pay for Housing in the Last Year: No     Number of Times Moved in the Last Year: 45     Homeless in the Last Year: No       Past Surgical History:   Procedure Laterality Date    APPENDECTOMY      FRACTURE SURGERY         XR wrist right 3+ views    Result Date: 2/5/2025  Interpreted By:  Schoenberger, Joseph, STUDY: XR WRIST RIGHT 3+ VIEWS; ;  2/5/2025 11:34 am   INDICATION: Signs/Symptoms:R wrist pain after fall.     COMPARISON: None.   ACCESSION NUMBER(S): [id number]   ORDERING CLINICIAN: CONNIE COLEMAN   FINDINGS: There is no definite acute fracture or dislocation. There is mild  chondrocalcinosis in the triangular fibrocartilage. No erosive arthropathy. Mild DJD 1st carpometacarpal articulation.       No definite acute findings. See discussion above.     MACRO: None   Signed by: Joseph Schoenberger 2/5/2025 11:57 AM Dictation workstation:   [id number]    CT hip right wo IV contrast    Addendum Date: 2/3/2025    Interpreted By:  Jolie Mei, ADDENDUM: On further imaging review, there is a tiny crescentic shaped calcific density along the inferior margin of the pubic symphysis at the midline suggestive of an acute-subacute minimally displaced avulsion fracture.   Signed by: Jolie Mei 2/3/2025 11:38 AM   -------- ORIGINAL REPORT -------- Dictation workstation:   [id number]    Result Date: 2/3/2025  Interpreted By:  Jolie Mei, STUDY: CT HIP RIGHT WO IV CONTRAST;  2/3/2025 11:22 am   INDICATION: Signs/Symptoms:R hip pain, fall.   COMPARISON: Same day radiographs.   ACCESSION NUMBER(S): [id number]   ORDERING CLINICIAN: FABIO BASHIR   TECHNIQUE: CT imaging of the right hip was obtained without administration of intravenous contrast medium. Coronal and sagittal reformatted images were performed.   FINDINGS: OSSEOUS STRUCTURES: There is a minimal minimally displaced fracture of the anterior acetabulum extending into the base of the superior pubic ramus. No additional fracture is identified. No dislocation. There is mild femoroacetabular joint space narrowing posteriorly. No significant hip joint effusion is visualized.   SOFT TISSUES: No subcutaneous or intramuscular hematoma. A small fat containing right inguinal hernia is noted. No acute abnormality is present in the imaged pelvis.       Minimally displaced fracture anterior acetabulum extending into base of superior pubic ramus.   MACRO: None   Signed by: Jolie Mei 2/3/2025 11:33 AM Dictation workstation:   [id number]    XR shoulder right 2+ views    Result Date: 2/3/2025  Interpreted By:  Arden Michael, STUDY: XR  CLAVICLE RIGHT; XR HUMERUS RIGHT; XR SHOULDER RIGHT 2+ VIEWS; 2/3/2025 9:57 am   INDICATION: Signs/Symptoms:pain, fall; Signs/Symptoms:R arm pain fall; Signs/Symptoms:R shoulder pain.   COMPARISON: None.   ACCESSION NUMBER(S): [id number]; [id number]; [id number]   ORDERING CLINICIAN: FABIO BASHIR   FINDINGS: Bony structures:  Slightly impacted fracture at the humeral neck, and suspected nondisplaced fracture of the greater tuberosity. The remaining bony structures appear intact.   Joint spaces:  Maintained   Soft tissues:  Unremarkable without significant edema or radiodense foreign body   Other:  None significant       Humeral fracture.   MACRO: None   Signed by: Arden Michael 2/3/2025 10:15 AM Dictation workstation:   [id number]    XR humerus right    Result Date: 2/3/2025  Interpreted By:  Arden Michael, STUDY: XR CLAVICLE RIGHT; XR HUMERUS RIGHT; XR SHOULDER RIGHT 2+ VIEWS; 2/3/2025 9:57 am   INDICATION: Signs/Symptoms:pain, fall; Signs/Symptoms:R arm pain fall; Signs/Symptoms:R shoulder pain.   COMPARISON: None.   ACCESSION NUMBER(S): [id number]; [id number]; [id number]   ORDERING CLINICIAN: FABIO BASHIR   FINDINGS: Bony structures:  Slightly impacted fracture at the humeral neck, and suspected nondisplaced fracture of the greater tuberosity. The remaining bony structures appear intact.   Joint spaces:  Maintained   Soft tissues:  Unremarkable without significant edema or radiodense foreign body   Other:  None significant       Humeral fracture.   MACRO: None   Signed by: Arden Michael 2/3/2025 10:15 AM Dictation workstation:   [id number]    XR clavicle right    Result Date: 2/3/2025  Interpreted By:  Arden Michael, STUDY: XR CLAVICLE RIGHT; XR HUMERUS RIGHT; XR SHOULDER RIGHT 2+ VIEWS; 2/3/2025 9:57 am   INDICATION: Signs/Symptoms:pain, fall; Signs/Symptoms:R arm pain fall; Signs/Symptoms:R shoulder pain.   COMPARISON: None.   ACCESSION NUMBER(S): [id number]; [id number]; [id number]   ORDERING  CLINICIAN: FABIO BASHIR   FINDINGS: Bony structures:  Slightly impacted fracture at the humeral neck, and suspected nondisplaced fracture of the greater tuberosity. The remaining bony structures appear intact.   Joint spaces:  Maintained   Soft tissues:  Unremarkable without significant edema or radiodense foreign body   Other:  None significant       Humeral fracture.   MACRO: None   Signed by: Arden Michael 2/3/2025 10:15 AM Dictation workstation:   [id number]    XR chest 1 view    Result Date: 2/3/2025  Interpreted By:  Arden Michael, STUDY: XR CHEST 1 VIEW;  2/3/2025 9:57 am   INDICATION: Signs/Symptoms:fall.   COMPARISON: None.   ACCESSION NUMBER(S): [id number]   ORDERING CLINICIAN: FABIO BASHIR   FINDINGS: CARDIOMEDIASTINAL SILHOUETTE AND VASCULATURE:   Cardiac size:  Within normal limits. Aortic shadow:  Within normal limits.   Mediastinal contours: Within normal limits.   Pulmonary vasculature:  The central vasculature is unremarkable   LUNGS: Several linear opacities at the left lower lung medially may be due to atelectasis and/or fibrosis. The lungs otherwise are clear.   ABDOMEN AND OTHER FINDINGS: No remarkable upper abdominal findings.   BONES: Slightly impacted fracture of the right humeral neck is noted.       1.  Left lower lung atelectasis or fibrosis. 2. Right humeral fracture.   Signed by: Arden Michael 2/3/2025 10:12 AM Dictation workstation:   [id number]    XR hip right with pelvis when performed 2 or 3 views    Result Date: 2/3/2025  Interpreted By:  Arden Michael, STUDY: XR HIP RIGHT WITH PELVIS WHEN PERFORMED 2 OR 3 VIEWS;  2/3/2025 9:57 am   INDICATION: Signs/Symptoms:R hip pain, fall.   COMPARISON: None.   ACCESSION NUMBER(S): [id number]   ORDERING CLINICIAN: FABIO BASHIR   FINDINGS: Bony structures:  Intact   Joint spaces:  Maintained   Soft tissues:  Unremarkable without significant edema or radiodense foreign body   Other:  None significant       No acute findings.   MACRO: None    Signed by: Arden Michael 2/3/2025 10:11 AM Dictation workstation:   [id number]    CT head wo IV contrast    Result Date: 2/3/2025  Interpreted By:  Israel Cruz, STUDY: CT HEAD WO IV CONTRAST; CT CERVICAL SPINE WO IV CONTRAST;  2/3/2025 9:28 am   INDICATION: Signs/Symptoms:fall head injury; Signs/Symptoms:fall.     COMPARISON: None   ACCESSION NUMBER(S): [id number]; [id number]   ORDERING CLINICIAN: FABIO BASHIR   TECHNIQUE: CT of the brain from the skull vertex to the skull base, without intravenous contrast   CT cervical spine from the craniocervical junction through the cervicothoracic junction without IV contrast, including sagittal and coronal reformatted images   FINDINGS: CT BRAIN   TRAUMA-RELATED   Brain Injury (BIG) guidelines CT values:   Skull fracture: No SDH (subdural hematoma): None detected EDH (epidural hematoma): None detected IPH (intraparenchymal hemorrhage): None detected SAH (subarachnoid hemorrhage): None detected IVH (intraventricular hemorrhage): None detected   Reference: Jono RICHARDS, Péerz RS, Sea M, et al. The BIG (brain injury guidelines) project: defining the management of traumatic brain injury by acute care surgeons. J Trauma Acute Care Surg. 2014;76:026o470.   OTHER   ACUTE INTRACRANIAL MASS EFFECT:  Negative   CT EVIDENCE OF ACUTE / SUBACUTE TERRITORIAL ISCHEMIA:  Negative   VENTRICLES:  Normal caliber and configuration   OTHER BRAIN FINDINGS:  No additional findings to note   INCLUDED PARANASAL SINUSES: All clear   INCLUDED MASTOID AIR CELLS: All clear   SKULL:  No lytic or blastic lesion   EXTRACRANIAL SOFT TISSUES:  Scalp and occular globes grossly normal by CT   -------   CT CERVICAL SPINE   COUNTING REFERENCE: Craniocervical junction   CRANIOCERVICAL JUNCTION:  Intact   CERVICAL ALIGNMENT:  Rightward curvature is probably positional. No jumped facets are other acute traumatic alignment abnormality   ACUTE FRACTURE: Negative   AGGRESSIVE OSSEOUS LESION: Negative   BONY CANAL  AND FORAMINA:  Disc osteophyte complexes at C3-4 and C5-6 contribute to mild and moderate bony foraminal and bony canal stenoses   PARASPINAL SOFT TISSUES:  No large acute hematoma or other acute posttraumatic finding   OTHER INCLUDED STRUCTURES:  No acute or contributory soft tissue abnormality in the other cervical and upper thoracic soft tissues       NO ACUTE INTRACRANIAL PROCESS. SKULL INTACT   NO ACUTE FRACTURE OR SUBLUXATION IN THE CERVICAL SPINE   THIS REPORT SERVES AS THE DIAGNOSTIC INTERPRETATION FOR TWO EXAMS PERFORMED CONCURRENTLY: CT BRAIN WITHOUT IV CONTRAST AND CT CERVICAL SPINE WITHOUT IV CONTRAST   MACRO: None   Signed by: Israel Cruz 2/3/2025 9:46 AM Dictation workstation:   [id number]    CT cervical spine wo IV contrast    Result Date: 2/3/2025  Interpreted By:  Israel Cruz, STUDY: CT HEAD WO IV CONTRAST; CT CERVICAL SPINE WO IV CONTRAST;  2/3/2025 9:28 am   INDICATION: Signs/Symptoms:fall head injury; Signs/Symptoms:fall.     COMPARISON: None   ACCESSION NUMBER(S): [id number]; [id number]   ORDERING CLINICIAN: FABIO BASHIR   TECHNIQUE: CT of the brain from the skull vertex to the skull base, without intravenous contrast   CT cervical spine from the craniocervical junction through the cervicothoracic junction without IV contrast, including sagittal and coronal reformatted images   FINDINGS: CT BRAIN   TRAUMA-RELATED   Brain Injury (BIG) guidelines CT values:   Skull fracture: No SDH (subdural hematoma): None detected EDH (epidural hematoma): None detected IPH (intraparenchymal hemorrhage): None detected SAH (subarachnoid hemorrhage): None detected IVH (intraventricular hemorrhage): None detected   Reference: Jono RICHARDS, Pérez RS, Sea M, et al. The BIG (brain injury guidelines) project: defining the management of traumatic brain injury by acute care surgeons. J Trauma Acute Care Surg. 2014;76:699t612.   OTHER   ACUTE INTRACRANIAL MASS EFFECT:  Negative   CT EVIDENCE OF ACUTE / SUBACUTE  TERRITORIAL ISCHEMIA:  Negative   VENTRICLES:  Normal caliber and configuration   OTHER BRAIN FINDINGS:  No additional findings to note   INCLUDED PARANASAL SINUSES: All clear   INCLUDED MASTOID AIR CELLS: All clear   SKULL:  No lytic or blastic lesion   EXTRACRANIAL SOFT TISSUES:  Scalp and occular globes grossly normal by CT   -------   CT CERVICAL SPINE   COUNTING REFERENCE: Craniocervical junction   CRANIOCERVICAL JUNCTION:  Intact   CERVICAL ALIGNMENT:  Rightward curvature is probably positional. No jumped facets are other acute traumatic alignment abnormality   ACUTE FRACTURE: Negative   AGGRESSIVE OSSEOUS LESION: Negative   BONY CANAL AND FORAMINA:  Disc osteophyte complexes at C3-4 and C5-6 contribute to mild and moderate bony foraminal and bony canal stenoses   PARASPINAL SOFT TISSUES:  No large acute hematoma or other acute posttraumatic finding   OTHER INCLUDED STRUCTURES:  No acute or contributory soft tissue abnormality in the other cervical and upper thoracic soft tissues       NO ACUTE INTRACRANIAL PROCESS. SKULL INTACT   NO ACUTE FRACTURE OR SUBLUXATION IN THE CERVICAL SPINE   THIS REPORT SERVES AS THE DIAGNOSTIC INTERPRETATION FOR TWO EXAMS PERFORMED CONCURRENTLY: CT BRAIN WITHOUT IV CONTRAST AND CT CERVICAL SPINE WITHOUT IV CONTRAST   MACRO: None   Signed by: Israel Cruz 2/3/2025 9:46 AM Dictation workstation:   [id number]    ECG 12 lead    Result Date: 2/3/2025  Normal sinus rhythm Nonspecific ST abnormality Abnormal ECG When compared with ECG of 03-FEB-2025 09:04, (unconfirmed) No significant change was found         Scribe Attestation  By signing my name below, [email or web address], Scribe   attest that this documentation has been prepared under the direction and in the presence of Diego Camacho MD.

## 2025-05-09 ENCOUNTER — OFFICE VISIT (OUTPATIENT)
Dept: ORTHOPEDIC SURGERY | Facility: CLINIC | Age: 75
End: 2025-05-09
Payer: MEDICARE

## 2025-05-09 ENCOUNTER — HOSPITAL ENCOUNTER (OUTPATIENT)
Dept: RADIOLOGY | Facility: CLINIC | Age: 75
Discharge: HOME | End: 2025-05-09
Payer: MEDICARE

## 2025-05-09 DIAGNOSIS — M25.511 RIGHT SHOULDER PAIN, UNSPECIFIED CHRONICITY: ICD-10-CM

## 2025-05-09 DIAGNOSIS — S42.201D CLOSED FRACTURE OF PROXIMAL END OF RIGHT HUMERUS WITH ROUTINE HEALING, UNSPECIFIED FRACTURE MORPHOLOGY, SUBSEQUENT ENCOUNTER: Primary | ICD-10-CM

## 2025-05-09 PROCEDURE — 99212 OFFICE O/P EST SF 10 MIN: CPT | Performed by: ORTHOPAEDIC SURGERY

## 2025-05-09 PROCEDURE — 73030 X-RAY EXAM OF SHOULDER: CPT | Mod: RT

## 2025-05-12 ENCOUNTER — OFFICE VISIT (OUTPATIENT)
Dept: URGENT CARE | Age: 75
End: 2025-05-12
Payer: MEDICARE

## 2025-05-12 VITALS
BODY MASS INDEX: 24.92 KG/M2 | DIASTOLIC BLOOD PRESSURE: 84 MMHG | RESPIRATION RATE: 18 BRPM | SYSTOLIC BLOOD PRESSURE: 163 MMHG | HEART RATE: 92 BPM | OXYGEN SATURATION: 97 % | WEIGHT: 146 LBS | TEMPERATURE: 100.1 F | HEIGHT: 64 IN

## 2025-05-12 DIAGNOSIS — H66.92 LEFT OTITIS MEDIA, UNSPECIFIED OTITIS MEDIA TYPE: ICD-10-CM

## 2025-05-12 DIAGNOSIS — H10.32 ACUTE CONJUNCTIVITIS OF LEFT EYE, UNSPECIFIED ACUTE CONJUNCTIVITIS TYPE: Primary | ICD-10-CM

## 2025-05-12 RX ORDER — POLYMYXIN B SULFATE AND TRIMETHOPRIM 1; 10000 MG/ML; [USP'U]/ML
1 SOLUTION OPHTHALMIC 4 TIMES DAILY
Qty: 10 ML | Refills: 0 | Status: SHIPPED | OUTPATIENT
Start: 2025-05-12 | End: 2025-05-19

## 2025-05-12 RX ORDER — AMOXICILLIN AND CLAVULANATE POTASSIUM 875; 125 MG/1; MG/1
1 TABLET, FILM COATED ORAL 2 TIMES DAILY
Qty: 20 TABLET | Refills: 0 | Status: SHIPPED | OUTPATIENT
Start: 2025-05-12 | End: 2025-05-22

## 2025-05-12 ASSESSMENT — PAIN SCALES - GENERAL: PAINLEVEL_OUTOF10: 0-NO PAIN

## 2025-05-12 NOTE — PROGRESS NOTES
"Subjective   Patient ID: Mana Avila is a 75 y.o. female. They present today with a chief complaint of Eye Problem, Earache, Nasal Congestion, and Sore Throat (Left eye redness x 3 days ).    History of Present Illness  HPI    Pt presents to urgent care with c/o    sore throat, earache, left eye redness x 3 days.  Denies visual changes.  Pt denies CP, SOB, palpitations, fevers, abd pain, n/v/d, sick contacts, recent travel.       Past Medical History  Allergies as of 05/12/2025    (No Known Allergies)       Prescriptions Prior to Admission[1]     Medical History[2]    Surgical History[3]     reports that she quit smoking about 50 years ago. Her smoking use included cigarettes. She has never used smokeless tobacco.    Review of Systems  Review of Systems   Constitutional: Negative.    HENT:  Positive for ear pain and sore throat.    Eyes:  Positive for discharge and redness. Negative for pain and visual disturbance.   Respiratory: Negative.     Cardiovascular:  Negative for chest pain and palpitations.   Gastrointestinal: Negative.    Endocrine: Negative.    Genitourinary: Negative.    Musculoskeletal: Negative.    Skin: Negative.    Allergic/Immunologic: Negative.    Neurological: Negative.    Hematological: Negative.    Psychiatric/Behavioral: Negative.     All other systems reviewed and are negative.                                 Objective    Vitals:    05/12/25 1930   BP: 163/84   BP Location: Left arm   Patient Position: Sitting   Pulse: 92   Resp: 18   Temp: 37.8 °C (100.1 °F)   TempSrc: Oral   SpO2: 97%   Weight: 66.2 kg (146 lb)   Height: 1.626 m (5' 4\")     No LMP recorded.    Physical Exam  Vitals and nursing note reviewed.   Constitutional:       General: She is not in acute distress.     Appearance: Normal appearance. She is not ill-appearing or toxic-appearing.   HENT:      Head: Atraumatic.      Right Ear: Tympanic membrane, ear canal and external ear normal. There is no impacted cerumen.      Left " Ear: Ear canal and external ear normal. There is no impacted cerumen. Tympanic membrane is erythematous.      Nose: Congestion present.      Mouth/Throat:      Mouth: Mucous membranes are moist.      Pharynx: Oropharynx is clear.   Eyes:      General: Lids are normal. Vision grossly intact. Gaze aligned appropriately.      Extraocular Movements: Extraocular movements intact.      Conjunctiva/sclera:      Left eye: Left conjunctiva is injected. Exudate present.      Pupils: Pupils are equal, round, and reactive to light.   Cardiovascular:      Rate and Rhythm: Normal rate.      Pulses: Normal pulses.      Heart sounds: Normal heart sounds.   Pulmonary:      Effort: Pulmonary effort is normal.   Skin:     General: Skin is warm and dry.   Neurological:      General: No focal deficit present.      Mental Status: She is alert and oriented to person, place, and time.   Psychiatric:         Mood and Affect: Mood normal.         Behavior: Behavior normal.         Thought Content: Thought content normal.         Procedures    Point of Care Test & Imaging Results from this visit  No results found for this visit on 05/12/25.   Imaging  No results found.    Cardiology, Vascular, and Other Imaging  No other imaging results found for the past 2 days      Diagnostic study results (if any) were reviewed by HARPREET Vasquez.    Assessment/Plan   Allergies, medications, history, and pertinent labs/EKGs/Imaging reviewed by HARPREET Vasquez.     Medical Decision Making    Exam is consistent with left otitis media and acute left conjunctivitis.  Will DC home with prescription Augmentin and Polytrim eyedrops.At time of discharge patient was clinically well-appearing and HDS for outpatient management. The patient was educated regarding diagnosis, supportive care, OTC and Rx medications. The patient was given the opportunity to ask questions prior to discharge.  They verbalized understanding of my discussion of the plans  for treatment, expected course, indications to return to  or seek further evaluation in ED, and the need for timely follow up as directed.   They were provided with a work/school excuse if requested.       Orders and Diagnoses  Diagnoses and all orders for this visit:  Acute conjunctivitis of left eye, unspecified acute conjunctivitis type  -     polymyxin B sulf-trimethoprim (Polytrim) ophthalmic solution; Administer 1 drop into the left eye 4 times a day for 7 days.  Left otitis media, unspecified otitis media type  -     amoxicillin-clavulanate (Augmentin) 875-125 mg tablet; Take 1 tablet by mouth 2 times a day for 10 days.      Medical Admin Record      Patient disposition: Home    Electronically signed by HARPREET Vasquez  10:13 AM           [1] (Not in a hospital admission)   [2]   Past Medical History:  Diagnosis Date    Arthritis     Cancer (Multi)     Hypertension    [3]   Past Surgical History:  Procedure Laterality Date    APPENDECTOMY      FRACTURE SURGERY

## 2025-05-15 ASSESSMENT — ENCOUNTER SYMPTOMS
MUSCULOSKELETAL NEGATIVE: 1
PALPITATIONS: 0
EYE PAIN: 0
EYE REDNESS: 1
NEUROLOGICAL NEGATIVE: 1
RESPIRATORY NEGATIVE: 1
PSYCHIATRIC NEGATIVE: 1
EYE DISCHARGE: 1
ENDOCRINE NEGATIVE: 1
GASTROINTESTINAL NEGATIVE: 1
CONSTITUTIONAL NEGATIVE: 1
HEMATOLOGIC/LYMPHATIC NEGATIVE: 1
SORE THROAT: 1
ALLERGIC/IMMUNOLOGIC NEGATIVE: 1

## 2025-05-15 ASSESSMENT — VISUAL ACUITY: OU: 1
